# Patient Record
Sex: MALE
[De-identification: names, ages, dates, MRNs, and addresses within clinical notes are randomized per-mention and may not be internally consistent; named-entity substitution may affect disease eponyms.]

---

## 2017-10-17 ENCOUNTER — HOSPITAL ENCOUNTER (EMERGENCY)
Dept: HOSPITAL 5 - ED | Age: 67
Discharge: LEFT BEFORE DISCHARGE | End: 2017-10-17
Payer: MEDICARE

## 2017-10-17 VITALS — DIASTOLIC BLOOD PRESSURE: 110 MMHG | SYSTOLIC BLOOD PRESSURE: 143 MMHG

## 2017-10-17 DIAGNOSIS — Z53.21: ICD-10-CM

## 2017-10-17 DIAGNOSIS — R51: Primary | ICD-10-CM

## 2017-10-17 LAB
ANION GAP SERPL CALC-SCNC: 20 MMOL/L
APTT BLD: 30.4 SEC. (ref 24.2–36.6)
BASOPHILS NFR BLD AUTO: 0.2 % (ref 0–1.8)
BUN SERPL-MCNC: 12 MG/DL (ref 9–20)
BUN/CREAT SERPL: 15 %
CALCIUM SERPL-MCNC: 9.6 MG/DL (ref 8.4–10.2)
CHLORIDE SERPL-SCNC: 102.3 MMOL/L (ref 98–107)
CO2 SERPL-SCNC: 22 MMOL/L (ref 22–30)
EOSINOPHIL NFR BLD AUTO: 2.8 % (ref 0–4.3)
GLUCOSE SERPL-MCNC: 104 MG/DL (ref 75–100)
HCT VFR BLD CALC: 45.8 % (ref 35.5–45.6)
HGB BLD-MCNC: 15.3 GM/DL (ref 11.8–15.2)
INR PPP: 1.01 (ref 0.87–1.13)
MCH RBC QN AUTO: 28 PG (ref 28–32)
MCHC RBC AUTO-ENTMCNC: 34 % (ref 32–34)
MCV RBC AUTO: 83 FL (ref 84–94)
PLATELET # BLD: 261 K/MM3 (ref 140–440)
POTASSIUM SERPL-SCNC: 4 MMOL/L (ref 3.6–5)
RBC # BLD AUTO: 5.52 M/MM3 (ref 3.65–5.03)
SODIUM SERPL-SCNC: 140 MMOL/L (ref 137–145)
WBC # BLD AUTO: 12.4 K/MM3 (ref 4.5–11)

## 2017-10-17 PROCEDURE — 85610 PROTHROMBIN TIME: CPT

## 2017-10-17 PROCEDURE — 36415 COLL VENOUS BLD VENIPUNCTURE: CPT

## 2017-10-17 PROCEDURE — 84484 ASSAY OF TROPONIN QUANT: CPT

## 2017-10-17 PROCEDURE — 85670 THROMBIN TIME PLASMA: CPT

## 2017-10-17 PROCEDURE — 85730 THROMBOPLASTIN TIME PARTIAL: CPT

## 2017-10-17 PROCEDURE — 93010 ELECTROCARDIOGRAM REPORT: CPT

## 2017-10-17 PROCEDURE — 80048 BASIC METABOLIC PNL TOTAL CA: CPT

## 2017-10-17 PROCEDURE — 85025 COMPLETE CBC W/AUTO DIFF WBC: CPT

## 2017-10-17 PROCEDURE — 93005 ELECTROCARDIOGRAM TRACING: CPT

## 2017-10-17 PROCEDURE — 70450 CT HEAD/BRAIN W/O DYE: CPT

## 2017-10-17 NOTE — CAT SCAN REPORT
Cranial CT without contrast.



History: Altered mental status.



Findings: Comparison is made to previous study on Valery 10, 2017. There 

is no evidence of an acute hemorrhage or infarct. The posterior fossa 

is normal. There are no masses or extra-axial collections. There is no 

hyperdense MCA sign. The calvarium is normal.



Impression: No acute findings or interval changes.

## 2019-08-15 ENCOUNTER — HOSPITAL ENCOUNTER (INPATIENT)
Dept: HOSPITAL 5 - 3A | Age: 69
LOS: 8 days | Discharge: HOME HEALTH | DRG: 552 | End: 2019-08-23
Attending: PHYSICAL MEDICINE & REHABILITATION | Admitting: PHYSICAL MEDICINE & REHABILITATION
Payer: MEDICARE

## 2019-08-15 DIAGNOSIS — Z73.6: ICD-10-CM

## 2019-08-15 DIAGNOSIS — Z79.899: ICD-10-CM

## 2019-08-15 DIAGNOSIS — Z96.651: ICD-10-CM

## 2019-08-15 DIAGNOSIS — B19.20: ICD-10-CM

## 2019-08-15 DIAGNOSIS — Z79.82: ICD-10-CM

## 2019-08-15 DIAGNOSIS — Z95.0: ICD-10-CM

## 2019-08-15 DIAGNOSIS — G43.909: ICD-10-CM

## 2019-08-15 DIAGNOSIS — Z88.1: ICD-10-CM

## 2019-08-15 DIAGNOSIS — E78.5: ICD-10-CM

## 2019-08-15 DIAGNOSIS — R26.89: ICD-10-CM

## 2019-08-15 DIAGNOSIS — I48.91: ICD-10-CM

## 2019-08-15 DIAGNOSIS — R26.81: ICD-10-CM

## 2019-08-15 DIAGNOSIS — M43.27: Primary | ICD-10-CM

## 2019-08-15 DIAGNOSIS — F32.9: ICD-10-CM

## 2019-08-15 DIAGNOSIS — Z82.49: ICD-10-CM

## 2019-08-15 DIAGNOSIS — Z90.49: ICD-10-CM

## 2019-08-15 DIAGNOSIS — I10: ICD-10-CM

## 2019-08-15 DIAGNOSIS — Z98.1: ICD-10-CM

## 2019-08-15 DIAGNOSIS — F43.10: ICD-10-CM

## 2019-08-15 LAB
ALBUMIN SERPL-MCNC: 4.1 G/DL (ref 3.9–5)
ALT SERPL-CCNC: 33 UNITS/L (ref 7–56)
BUN SERPL-MCNC: 15 MG/DL (ref 9–20)
BUN/CREAT SERPL: 17 %
CALCIUM SERPL-MCNC: 9.6 MG/DL (ref 8.4–10.2)
HEMOLYSIS INDEX: 88

## 2019-08-15 PROCEDURE — 36415 COLL VENOUS BLD VENIPUNCTURE: CPT

## 2019-08-15 PROCEDURE — 85025 COMPLETE CBC W/AUTO DIFF WBC: CPT

## 2019-08-15 PROCEDURE — 80074 ACUTE HEPATITIS PANEL: CPT

## 2019-08-15 PROCEDURE — 80053 COMPREHEN METABOLIC PANEL: CPT

## 2019-08-15 PROCEDURE — 85027 COMPLETE CBC AUTOMATED: CPT

## 2019-08-15 PROCEDURE — 72192 CT PELVIS W/O DYE: CPT

## 2019-08-15 PROCEDURE — 72131 CT LUMBAR SPINE W/O DYE: CPT

## 2019-08-15 PROCEDURE — 80048 BASIC METABOLIC PNL TOTAL CA: CPT

## 2019-08-15 RX ADMIN — DOCUSATE SODIUM SCH MG: 100 CAPSULE, LIQUID FILLED ORAL at 21:48

## 2019-08-15 RX ADMIN — GABAPENTIN SCH MG: 300 CAPSULE ORAL at 21:47

## 2019-08-15 RX ADMIN — CARVEDILOL SCH: 3.12 TABLET, FILM COATED ORAL at 21:51

## 2019-08-15 RX ADMIN — VENLAFAXINE SCH MG: 37.5 TABLET ORAL at 21:47

## 2019-08-15 RX ADMIN — OXYCODONE PRN MG: 5 TABLET ORAL at 18:00

## 2019-08-15 RX ADMIN — TAMSULOSIN HYDROCHLORIDE SCH MG: 0.4 CAPSULE ORAL at 21:47

## 2019-08-15 RX ADMIN — VENLAFAXINE SCH: 37.5 TABLET ORAL at 21:55

## 2019-08-15 RX ADMIN — TAMSULOSIN HYDROCHLORIDE SCH: 0.4 CAPSULE ORAL at 21:55

## 2019-08-16 LAB
BASOPHILS # (AUTO): 0 K/MM3 (ref 0–0.1)
BASOPHILS NFR BLD AUTO: 0.2 % (ref 0–1.8)
BUN SERPL-MCNC: 17 MG/DL (ref 9–20)
BUN/CREAT SERPL: 21 %
CALCIUM SERPL-MCNC: 9.7 MG/DL (ref 8.4–10.2)
EOSINOPHIL # BLD AUTO: 0.8 K/MM3 (ref 0–0.4)
EOSINOPHIL NFR BLD AUTO: 6.3 % (ref 0–4.3)
HCT VFR BLD CALC: 46.2 % (ref 35.5–45.6)
HEMOLYSIS INDEX: 3
HGB BLD-MCNC: 15.3 GM/DL (ref 11.8–15.2)
LYMPHOCYTES # BLD AUTO: 1 K/MM3 (ref 1.2–5.4)
LYMPHOCYTES NFR BLD AUTO: 8 % (ref 13.4–35)
MCHC RBC AUTO-ENTMCNC: 33 % (ref 32–34)
MCV RBC AUTO: 89 FL (ref 84–94)
MONOCYTES # (AUTO): 1.5 K/MM3 (ref 0–0.8)
MONOCYTES % (AUTO): 12.7 % (ref 0–7.3)
PLATELET # BLD: 242 K/MM3 (ref 140–440)
RBC # BLD AUTO: 5.18 M/MM3 (ref 3.65–5.03)

## 2019-08-16 RX ADMIN — GABAPENTIN SCH MG: 300 CAPSULE ORAL at 21:36

## 2019-08-16 RX ADMIN — ENOXAPARIN SODIUM SCH MG: 100 INJECTION SUBCUTANEOUS at 21:49

## 2019-08-16 RX ADMIN — CARVEDILOL SCH MG: 3.12 TABLET, FILM COATED ORAL at 08:15

## 2019-08-16 RX ADMIN — NICOTINE SCH MG: 21 PATCH TRANSDERMAL at 08:15

## 2019-08-16 RX ADMIN — VENLAFAXINE SCH MG: 37.5 TABLET ORAL at 08:14

## 2019-08-16 RX ADMIN — LIDOCAINE SCH EACH: 50 PATCH TOPICAL at 22:27

## 2019-08-16 RX ADMIN — TAMSULOSIN HYDROCHLORIDE SCH MG: 0.4 CAPSULE ORAL at 21:37

## 2019-08-16 RX ADMIN — OXYCODONE SCH MG: 5 TABLET ORAL at 15:55

## 2019-08-16 RX ADMIN — Medication SCH MCG: at 08:14

## 2019-08-16 RX ADMIN — VENLAFAXINE SCH: 37.5 TABLET ORAL at 08:15

## 2019-08-16 RX ADMIN — GABAPENTIN SCH MG: 300 CAPSULE ORAL at 06:49

## 2019-08-16 RX ADMIN — CARVEDILOL SCH MG: 3.12 TABLET, FILM COATED ORAL at 21:36

## 2019-08-16 RX ADMIN — OXYCODONE PRN MG: 5 TABLET ORAL at 21:39

## 2019-08-16 RX ADMIN — OXYCODONE PRN MG: 5 TABLET ORAL at 15:54

## 2019-08-16 RX ADMIN — OXYCODONE PRN MG: 5 TABLET ORAL at 03:45

## 2019-08-16 RX ADMIN — VENLAFAXINE SCH MG: 37.5 TABLET ORAL at 21:37

## 2019-08-16 RX ADMIN — OXYCODONE PRN MG: 5 TABLET ORAL at 09:47

## 2019-08-16 RX ADMIN — DOCUSATE SODIUM SCH MG: 100 CAPSULE, LIQUID FILLED ORAL at 21:37

## 2019-08-16 RX ADMIN — PREGABALIN SCH MG: 25 CAPSULE ORAL at 21:36

## 2019-08-16 RX ADMIN — DOCUSATE SODIUM SCH MG: 100 CAPSULE, LIQUID FILLED ORAL at 08:14

## 2019-08-16 RX ADMIN — CHOLECALCIFEROL TAB 10 MCG (400 UNIT) SCH UNIT: 10 TAB at 08:14

## 2019-08-16 RX ADMIN — ASPIRIN SCH MG: 81 TABLET, COATED ORAL at 08:14

## 2019-08-16 RX ADMIN — FOLIC ACID SCH MG: 1 TABLET ORAL at 08:14

## 2019-08-16 RX ADMIN — Medication SCH MG: at 08:14

## 2019-08-16 NOTE — EVENT NOTE
Date: 08/16/19





Notified by nursing that patient called his surgeon complaining of pain.  Had a 

prolonged discussion with patient regarding pain and made several adjustments in

medications to better control his pain.  He was happy at the time.  Seems to be 

manipulative.  Pain described as neuropathic in nature.  Discussed plan with the

patient earlier today at length.





Surgeon gave the ok for lovenox.





Surgeon also requested CT lumbar and RLE.





Will order those items requested.





Will have discussion with patient.

## 2019-08-16 NOTE — IRU PLAN OF CARE
Interdisciplinary Plan of Care





- IP


IRU INTERDISCIPLINARY PLAN: 


                     [facility] Inpatient Rehab Unit Plan of Care





IRU Interdisciplinary Care Plan                            Start:  08/15/19 

16:47


Freq:   Admission then PRN                                 Status: Active       




Protocol:                                                                       




 Document     08/16/19 20:18  TH  (Rec: 08/16/19 20:23  TH  [id number])


 Interdisciplinary Problem List


     Interdisciplinary Problem List


      Interdisciplinary Problem List             Impaired Dressing,Impaired


       Query Text:Answers will Trigger Problems  Mobility,Pain Management,


       and Outcomes on Worklist.                 Knowledge Deficits,Impaired


                                                 Skin/Tissue Integrity,


                                                 Discharge Concerns,Impaired


                                                 Safety,Medications Education


 IRU Interdisciplinary Care Plan


     Therapy Services


      Therapy Services Will Include:             Physical Therapy,Occupational


       Query Text:Patient will be seen for a     Therapy


       minimum of 3 hours of daily therapy 5     


       out of 7 days a week.  Therapy intensity  


       may be adjusted within a 7 consecutive    


       day period to effectively serve the       


       individual needs of the patient.          


     Treatment Frequency/Intensity/Duration


      Treatment Frequency                        5 days per week


      Treatment Intensity                        3 hours per day


      Treatment Duration                         7-10 days


     Problem Area: Eating/Swallowing


      Eating/Swallowing Outcomes                 


      Eating/Swallowing Interventions            


     Problem Area: Bathing/Grooming


      Bathing/Grooming Outcomes                  Improve [place] w/


                                                 Grooming,Improve [place]


                                                 w/ Bathing


      Bathing/Grooming Interventions             ADL Training,Use of Assistive


                                                 Devices,Therapeutic Exercise,


                                                 Therapeutic Activity,


                                                 Neuromuscular Re-Education,


                                                 Balance Work,Activity


                                                 Tolerance Work,Patient/


                                                 Caregiver Education


     Problem Area: Dressing


      Dressing Outcomes                          Improve [place] w/ LB


                                                 Dressing


      Dressing Interventions                     ADL Training,Use of Assistive


                                                 Devices,Neuromuscular Re-


                                                 Education,Therapeutic Exercise


                                                 ,Patient/Caregiver Education


     Problem Area: Mobility


      Mobility Outcomes                          Improve [place] w/


                                                 Ambulation,Improve


                                                 [place] w/ Stairs/Curb


      Mobility Interventions                     Therapeutic Exercise,


                                                 Neuromuscular Re-Ed.,Activity


                                                 Tolerance Work,Use of


                                                 Assistive Devices,Patient/


                                                 Caregiver Education,Gait


                                                 Training,Household Mobility


                                                 Work


     Problem Area: Transfers


      Transfers Outcomes                         Improve [place] w/ Bed


                                                 Transfers,Improve [place]


                                                 w/ Toilet Transfers,Improve


                                                 [place] w/ Tub/Shower


                                                 Transfers


      Transfers Interventions                    Transfer Training,Therapeutic


                                                 Exercise,Neuromuscular Re-


                                                 Education,Visual/Perceptual


                                                 Training,Activity Tolerance


                                                 Work,Modalities,Use of


                                                 Assistive Devices,Patient/


                                                 Caregiver Education


     Problem Area: Bowel/Bladder Managment


      Bowel/Bladder Outcomes                     Continent of Bladder


      Bowel/Bladder Interventions                Patient/Caregiver Education


     Problem Area: Toileting


      Toileting Outcomes                         Improve [place] w/


                                                 Toileting


      Toileting Interventions                    Balance Work,Use of Assistive


                                                 Devices,Patient/Caregiver


                                                 Education


     Problem Area: Nutrition


      Nutrition Outcomes                         Understand and Comply w/ Diet


      Nutrition Interventions                    Patient/Caregiver Education


     Problem Area: Comprehension


      Comprehension Outcomes                     Follow Commands


      Comprehension Interventions                Patient/Caregiver Education


     Problem Area: Expression


      Expression Outcomes                        


      Expression Interventions                   


     Problem Area: Problem Solving


      Problem Solving Outcomes                   


      Problem Solving Interventions              


     Problem Area: Memory


      Memory Outcomes                            


      Memory Interventions                       


     Problem Area: Pain Management


      Pain Management Outcomes                   Demonstrate/Verbalize Pain


                                                 Strategies


      Pain Management Interventions              Medication Management,Stress


                                                 Management,Use of Devices/


                                                 Modalities (TENS, hot pack,


                                                 cold pack, etc.),Positioning/


                                                 Turning,Patient/Caregiver


                                                 Education


     Problem Area: Knowledge Deficits


      Knowledge Deficits Outcomes                


      Knowledge Deficits Interventions           


     Problem Area: Skin/Tissue Integrity


      Skin/Tissue Integrity Outcomes             Exhibit Healing of Wound/


                                                 Incision,Demonstrate


                                                 Understanding of Self Wound


                                                 Care


      Skin/Tissue Integrity Interventions        Skin/Wound Care,Dressing


                                                 Change Education,Positioning/


                                                 Turning


     Problem Area: Social Interaction


      Social Interaction Outcomes                


      Social Interaction Interventions           


     Problem Area: Adjustment to Disability


      Adjustment to Disability Outcomes          


      Adjustment to Disability Interventions     


     Problem Area: Discharge Concerns


      Discharge Concerns Outcomes                Discharge w/ Necessary


                                                 Equipment,Have Home Health/


                                                 Outpatient Services


      Discharge Concerns Interventions           Discharge Planning,Family/


                                                 Caregiver Conference,Family/


                                                 Caregiver Training


     Problem Area: Community Reintegration


      Community Reintegration Outcomes           Demonstrate Understanding of


                                                 Community Resources


      Community Reintegration Interventions      Activity Tolerance Work,


                                                 Provide Community Resources


     Problem Area: Home Management


      Home Management Outcomes                   Improve [place] w/ Home


                                                 Management


      Home Management Interventions              Activity Tolerance Work,


                                                 Patient/Caregiver Education


     Problem Area: Safety


      Safety Outcomes                            Provide Safe Environment


      Safety Interventions                       Identify Fall Risk,Reduce


                                                 Environmental Hazards,Neuro


                                                 Check Assessment,Implement


                                                 Mechanical Devices, i.e. Chair


                                                 Alarm (Post Fall Update),Re-


                                                 Educate Patient/Caregiver for


                                                 Safety (Post Fall Update)


     Problem Area: Medication Education


      Medication Education Outcomes              Patient/Caregiver will


                                                 Verbalize Understanding of


                                                 Medications


      Medication Education Interventions         Explain Administration/Side


                                                 Effects/Interactions


     Problem Area: Diabetes Education


      Diabetes Education Outcomes                


      Diabetes Education Interventions           


     Problem Area: Oxygenation


      Oxygenation Outcomes                       


      Oxygenation Interventions                  


     Problem Area: Cardiovascular


      Cardiovascular Outcomes                    


      Cardiovascular Interventions               


     Physician Only


      Medical Prognosis and Rehabilitation       Good rehab potential and 

medical prognosis.  Will need to monitor pain control


       Potential (Completed by Physician)        





This plan of care has been developed based on the findings from the pre-

admission assessment, post admission physician evaluation, information gathered 

from the assessments from all therapy disciplines and other pertinent 

clinicians. The plan of care has been reviewed and discussed in collaboration 

with the interdisciplinary team. The plan of care will be reviewed and updated 

at least weekly.

## 2019-08-16 NOTE — HISTORY AND PHYSICAL REPORT
History of Present Illness


Date: 08/16/19


Date of admission: 


08/15/19 16:38





Chief Complaint: 


s/p L4-5 fusion, decreased mobility and ADL





History of present illness: 


68-year-old male with a history of previous lumbar neurogenic claudication and 

surgical fixation who experienced a return of symptoms with severe lumbar 

radicular pain.  He was evaluated by Dr. Cruz who determined that he had 

lumbar instability with subluxation of L4-L5 and would be a good candidate for 

lateral approach for surgical repair.  He underwent fusion on 8/12 with 

application of interbody cage and lateral plate and tolerated the procedure 

well.  He is to wear his back brace when out of bed.  He has a follow-up 

scheduled with Dr. Cruz.  After admission and was called in the evening 

and nursing noted his pain was poorly controlled.  Patient has a history of 

cirrhosis which is states that he underwent treatment for and has now been 

cleared by his hepatologist.  I discussed the issue of taking Tylenol which he 

was insistent upon doing earlier and he has agreed to avoid Tylenol.  We'll 

adjust his doses of medications with a short-term of scheduled Roxicodone along 

with Lidoderm patch.  He also stated he was abruptly taken off of gabapentin 

when he was started on the Lyrica but due to his symptoms he started taking his 

home dose of gabapentin again.  While he is here we will perform a cross taper 

of gabapentin into Lyrica.  We'll contact surgeon for his preferences on timing 

of restarting DVT prophylaxis.





After the patient was medically stabilized they were transferred for further 

rehabilitation.  All available medical records have been reviewed.  Plan of care

was discussed with patient and family.








Past History


Past Medical History: atrial fib, hepatitis, hypertension, hyperlipidemia, 

migraines, other (PTSD, brain bleed (unknown type) after a fall, depression)


Past Surgical History: cholecystectomy, total knee replacement (right), Other 

(pacemaker,)


Social history: , full code, other (one-story home, previously used a 

cane but was otherwise independent).  denies: smoking, alcohol abuse (former), 

prescription drug abuse, IV drug use


Family history: CAD, hypertension





Medications and Allergies


                                    Allergies











Allergy/AdvReac Type Severity Reaction Status Date / Time


 


haloperidol [From Haldol] Allergy  Unknown Verified 09/27/14 11:15


 


haloperidol lactate Allergy  Unknown Verified 09/27/14 11:15





[From Haldol]     


 


ketorolac [From Toradol] Allergy  Unknown Unverified 08/15/19 15:04


 


meperidine HCl [From Demerol] Allergy  Unknown Verified 09/27/14 11:15


 


nitroglycerin Allergy  Unknown Verified 09/27/14 11:15


 


tramadol Allergy  Unknown Verified 09/27/14 11:15


 


acetaminophen [From Tylenol] AdvReac  Unknown Verified 08/15/19 21:33











                                Home Medications











 Medication  Instructions  Recorded  Confirmed  Last Taken  Type


 


Aspirin [Aspirin BABY CHEW TAB] 81 mg PO QDAY 06/11/17 08/16/19 08/15/19 08:00 

History


 


AtorvaSTATin [Lipitor] 20 mg PO QHS 06/11/17 08/16/19 08/14/19 21:00 History


 


Carvedilol [Coreg] 3.125 mg PO BID 06/11/17 08/16/19 08/15/19 08:00 History


 


FLUoxetine HCL [PROzac] 40 mg PO QDAY 06/11/17 08/16/19 Unknown History


 


Losartan [Cozaar] 50 mg PO QDAY 06/11/17 08/16/19 Unknown History


 


Pantoprazole [Protonix TAB] 40 mg PO QDAY 06/11/17 08/16/19 08/15/19 08:00 

History


 


Tamsulosin [Flomax] 0.4 mg PO QDAY 06/11/17 08/16/19 08/15/19 08:30 History


 


Folic Acid [Folvite] 1 mg PO QDAY #30 tablet 06/12/17 08/16/19 08/15/19 08:00 Rx


 


Thiamine [Vitamin B-1] 100 mg PO QDAY #30 tablet 06/12/17 08/16/19 08/15/19 

08:30 Rx


 


chlordiazePOXIDE [Librium] 0 mg PO Q8H #16 capsule 06/12/17 08/16/19 Unknown Rx


 


oxyCODONE [roxiCODONE] 5 mg PO TID PRN #7 tablet 06/12/17 08/16/19 08/15/19 

08:30 Rx











Active Meds: 


Active Medications





Aspirin (Halfprin Ec)  81 mg PO QDAY [facility]


Atorvastatin Calcium (Lipitor)  10 mg PO QHS [facility]


   Last Admin: 08/15/19 21:47 Dose:  10 mg


   Documented by: 


Bisacodyl (Dulcolax)  10 mg MD QDAY PRN


   PRN Reason: Constipation


Carvedilol (Coreg)  3.125 mg PO BID [facility]


   Last Admin: 08/15/19 21:51 Dose:  Not Given


   Documented by: 


Cholecalciferol (Vitamin D3)  1,000 unit PO QDAY [facility]


Clonazepam (Klonopin)  0.5 mg PO BID [facility]


   Last Admin: 08/15/19 21:47 Dose:  0.5 mg


   Documented by: 


Cyanocobalamin (Vitamin B-12)  1,000 mcg PO QDAY [facility]


Docusate Sodium (Colace)  100 mg PO BID [facility]


   Last Admin: 08/15/19 21:48 Dose:  100 mg


   Documented by: 


Folic Acid (Folvite)  1 mg PO QDAY [facility]


Gabapentin (Neurontin)  300 mg PO Q8HR [facility]


   Last Admin: 08/16/19 06:49 Dose:  300 mg


   Documented by: 


Nicotine (Habitrol)  21 mg TD QDAY [facility]


Ondansetron HCl (Zofran Odt)  4 mg PO Q8H PRN


   PRN Reason: Nausea And Vomiting


Oxycodone HCl (Roxicodone)  10 mg PO Q6H PRN


   PRN Reason: Pain, Moderate (4-6)


   Last Admin: 08/16/19 03:45 Dose:  10 mg


   Documented by: 


Oxycodone/Acetaminophen (Percocet 5/325)  1 tab PO Q6H PRN


   PRN Reason: Pain, Moderate (4-6)


   Last Admin: 08/15/19 23:23 Dose:  1 tab


   Documented by: 


Polyethylene Glycol (Miralax 3350)  17 gm PO QDAY PRN


   PRN Reason: Constipation


Tamsulosin HCl (Flomax)  0.4 mg PO [facility]


   Last Admin: 08/15/19 21:55 Dose:  Not Given


   Documented by: 


Thiamine HCl (Vitamin B-1)  100 mg PO QDAY [facility]


Venlafaxine HCl (Effexor)  37.5 mg PO BID [facility]


   Last Admin: 08/15/19 21:55 Dose:  Not Given


   Documented by: 











Review of Systems


All systems: negative (ROS negative for 12 systems except as noted below with 

pertinent positives and negatives.)


Constitutional: chronic pain


Ears, nose, mouth and throat: no dysphagia


Cardiovascular: no chest pain, no palpitations, no rapid/irregular heart beat, 

no syncope, no shortness of breath


Respiratory: no cough, no cough with sputum


Gastrointestinal: no abdominal pain, no nausea, no vomiting, no diarrhea, no 

constipation


Genitourinary Male: no dysuria


Musculoskeletal: leg numbness/tingling (right, along the frontal aspect to the 

knee)


Integumentary: wounds (staples intact on surgical wound on her right flank), no 

rash, no pruritis


Neurological: weakness, parathesias


Psychiatric: anxiety, depression





Exam





- Exam


Narrative exam: 


MUSCULOSKELETAL SPECIALTY EXAM





CONSTITUTIONAL:


Well developed, well nourished, appropriately groomed, obese





LYMPHATIC: 


No appreciable abnormalities palpable in neck





EENT:


Visual fields full to confrontation.  EOMI.  Oropharynx clear. Hearing intact to

 soft voice





RESPIRATORY:


Clear to auscultation bilaterally, no increased work of breathing 





CARDIOVASCULAR:  


Regular Rate/ Rhythm, no swelling, edema or tenderness in BUE or BLE. Pulses 

palpable in all extremities. All extremities warm.  





GI: 


+ bowel sounds, soft, NTTP, nondistended.





INTEGUMENTARY:


Normal, no lesion, rash, masses or bruising noted in extremities.  Surgical site

 on her right flank intact with no signs of infection or drainage, staples 

intact.





MUSCULOSKELETAL:


BUE and BLE normal without defect, crepitus, subluxation, effusion, arthritic 

changes or TTP. 


BUE 4+/5, good ROM, with normal tone.  


LLE 4+/5 good ROM, with normal tone, RLE 4-/5 reduced ROM, with normal tone








NEURO:


CN 2-12 grossly intact. Sensation intact in all extremities.  Reflexes 2+ 

bilaterally at biceps, brachioradialis and 1+ at patella.  No clonus at ankles. 

Coordination intact in BUE. No tremor noted in 4 extremities.   





POSTURE and GAIT:


Sitting posture good. Balance appears reasonable.  Able to take short amount of 

steps without overt loss of balance.





PSYCH:


Alert, oriented x3, affect appears normal. Insight appears intact.








- Constitutional


Vitals: 


                               Vital Signs - 12hr











  08/15/19 08/15/19 08/16/19





  22:00 23:23 00:23


 


Temperature   


 


Pulse Rate   


 


Respiratory  17 16





Rate   


 


Respiratory 17  





Rate [Head]   


 


Blood Pressure   


 


O2 Sat by Pulse   





Oximetry   














  08/16/19 08/16/19 08/16/19





  03:45 04:30 04:45


 


Temperature  37.1 C 


 


Pulse Rate  60 


 


Respiratory 17 19 17





Rate   


 


Respiratory   





Rate [Head]   


 


Blood Pressure  122/68 


 


O2 Sat by Pulse  91 





Oximetry   














- Labs


CBC & Chem 7: 


                                 08/16/19 06:30





                                 08/16/19 06:30


Labs: 


                         Laboratory Results - last 72 hr











  08/15/19 08/16/19 08/16/19





  21:06 06:30 06:30


 


WBC   12.0 H 


 


RBC   5.18 H 


 


Hgb   15.3 H 


 


Hct   46.2 H 


 


MCV   89 


 


MCH   29 


 


MCHC   33 


 


RDW   14.7 


 


Plt Count   242 


 


Lymph % (Auto)   8.0 L 


 


Mono % (Auto)   12.7 H 


 


Eos % (Auto)   6.3 H 


 


Baso % (Auto)   0.2 


 


Lymph #   1.0 L 


 


Mono #   1.5 H 


 


Eos #   0.8 H 


 


Baso #   0.0 


 


Seg Neutrophils %   72.8 H 


 


Seg Neutrophils #   8.8 H 


 


Sodium  138   142


 


Potassium  4.9   5.3 H


 


Chloride  99.1   101.3


 


Carbon Dioxide  28   30


 


Anion Gap  16   16


 


BUN  15   17


 


Creatinine  0.9   0.8


 


Estimated GFR  > 60   > 60


 


BUN/Creatinine Ratio  17   21


 


Glucose  107 H   124 H


 


Calcium  9.6   9.7


 


Total Bilirubin  0.30  


 


AST  33  


 


ALT  33  


 


Alkaline Phosphatase  118  


 


Total Protein  7.0  


 


Albumin  4.1  


 


Albumin/Globulin Ratio  1.4  














Assessment and Plan


Assessment and plan: 





Patient was assessed and evaluated for Acute Inpatient Rehab Unit.


Due to the patients above-mentioned medical complexity, along with decreased 

functional mobility and self care, this patient continues to require and be 

appropriate for a comprehensive, multidisciplinary acute-in-patient 

rehabilitation program.  These needs cannot be met in an outpatient or other 

less intensive setting.  The patient would continue to benefit from skilled 

therapy intervention for at least 3 hours per day, five days a week, with 

techniques specific to the needs of the patient to improve function, activities 

of daily living, and reintegration into the community. The patient continues to 

require:


-- OT to improve ROM, self-care, and learn use of adaptive equipment


-- PT to improve strength and balance, functional transfers, and ambulation with

 energy conservation techniques to improve functional mobility


-- 24 hour RN to ensure and prevent skin breakdown, promote progressive 

independence while ensuring safety, ensure education regarding medications, and 

incorporation of the rehabilitation at the bedside


-- 24 hour Physiatrist to coordinate this interdisciplinary program, and to 

manage/prevent complications as a result of the patients medical comorbidities.

 


-Plan of care by day 4


-Weekly team conferences


With such a program, there is a reasonable certainty that the goals 

individualized for this patient can be achieved within the specified length of 

stay.





Status post L4 5 fusion: Pain poorly controlled currently will adjust pain 

medications and monitor for effect.  Patient is no longer seeing outpatient pain

 [facility]  We'll try to adjust his regimen for 

appropriate functional pain control.  Monitor incision site for any signs of 

bleeding or infection.  Staples are to be removed August 21.  Follow-up with 

 in 2 weeks.  Monitor for any changes in neurological status 

including saddle anesthesia.





Hypertension: Continue medications and adjust for normotension


Hyperlipidemia: Continue statin


Hepatitis C: Patient states that he was treated and has been cleared of this.  

We'll continue avoid medications with adverse effect on liver.


Depression and PTSD: Continue medications monitor for any adverse effects.  

Patient typically takes desvenlafaxine which we do not carry.  We'll substitute 

venlafaxine


Anxiety: continue Klonopin and monitor


Z73.6 ADL dysfunction: OT will work on improving ability to perform ADLs (

including assistive devices) to increase independence and decrease caregiver 

burden and improve functional transfers and mobility training.


R26.2 Difficulty walking: PT will work on gait training and proper use of 

assistive devices and advance as appropriate to use of stairs and outside 

ambulation on uneven surfaces.


R26.81 Unsteadiness on feet: PT will work on improving static and dynamic 

sitting and standing balance as well as proper use of assistive devices to 

decrease risk of falls.


R26.89 Abnormality of gait: PT will work to improve safety and efficiency of 

gait through neuromotor training and gait training along with instruction on 

proper use of assistive devices.


M62.81 Muscle weakness: PT & OT will work on strengthening exercises to improve 

functional strength including mixture of closed and open kinetic chain 

exercises.


R53.81 Debility: PT & OT will work on improving overall functional status to 

improve participation with ADLs, mobility and social involvement.


R53.83 Fatigue: PT & OT will work on improving endurance through aerobic 

exercises and therapeutic activity while monitoring patients tolerance for 

activity and vital signs as needed.


 





DVT ppx: SCDs, no pharmacologic methods until cleared by surgeon


Pain: Continue physical modalities in therapy and pain medications as needed to 

achieve functional pain control.  Adjust medications with scheduled dose of 

Roxicodone and breakthrough, Lidoderm patch at night, cross taper of gabapentin 

and Lyrica


Sleep: Monitor and address as needed. 


Bowel: Monitor and address as needed.  


Appetite: Monitor and address as needed.


Discharge planning: Pending therapy progress and care plan meeting. Will 

continue discussion with therapy team, SW, patient and family.





Restrictions/ Precautions:


Falls, pacemaker, spinal postsurgical precaution,, back brace on when out of bed





WB status: FWB





Functional Hx:


ADLs: Independent


Cognition: Independent


Mobility: Cane and at times felt like he could use a walker








Barriers to Discharge: Decreased mobility and ability to perform self care, 

balance deficits, weakness


Estimated Length of Stay: 1014 days


Discharge Destination: Home with family








POST ADMISSION PHYSICIAN EVALUATION


I have examined the patient and find that functional status, medical condition 

and appropriateness for IR[facility] admission are essentially unchanged from those 

described in the preadmission screening. Will monitor for worsening neurologic 

function, surgical wound drainage and are infection, saddle anesthesia, pain, 

DVT/PE,  bowel and bladder complications and complications due to hypertension, 

hepatitis C, depression/anxiety, anemia and electrolyte abnormalities.  Will 

attempt to avoid occurrence of these issues or treat them if they present 

themselves.

## 2019-08-17 LAB
BUN SERPL-MCNC: 16 MG/DL (ref 9–20)
BUN/CREAT SERPL: 23 %
CALCIUM SERPL-MCNC: 9.6 MG/DL (ref 8.4–10.2)
HCT VFR BLD CALC: 44.1 % (ref 35.5–45.6)
HEMOLYSIS INDEX: 2
HGB BLD-MCNC: 14.7 GM/DL (ref 11.8–15.2)
MCHC RBC AUTO-ENTMCNC: 33 % (ref 32–34)
MCV RBC AUTO: 88 FL (ref 84–94)
PLATELET # BLD: 247 K/MM3 (ref 140–440)
RBC # BLD AUTO: 4.99 M/MM3 (ref 3.65–5.03)

## 2019-08-17 RX ADMIN — DOCUSATE SODIUM SCH MG: 100 CAPSULE, LIQUID FILLED ORAL at 21:38

## 2019-08-17 RX ADMIN — OXYCODONE SCH MG: 5 TABLET ORAL at 17:54

## 2019-08-17 RX ADMIN — ASPIRIN SCH MG: 81 TABLET, COATED ORAL at 08:54

## 2019-08-17 RX ADMIN — PREGABALIN SCH MG: 25 CAPSULE ORAL at 21:38

## 2019-08-17 RX ADMIN — PREGABALIN SCH MG: 25 CAPSULE ORAL at 13:57

## 2019-08-17 RX ADMIN — VENLAFAXINE SCH MG: 37.5 TABLET ORAL at 08:53

## 2019-08-17 RX ADMIN — GABAPENTIN SCH MG: 300 CAPSULE ORAL at 08:53

## 2019-08-17 RX ADMIN — FOLIC ACID SCH MG: 1 TABLET ORAL at 08:53

## 2019-08-17 RX ADMIN — CARVEDILOL SCH MG: 3.12 TABLET, FILM COATED ORAL at 21:37

## 2019-08-17 RX ADMIN — Medication SCH MCG: at 08:53

## 2019-08-17 RX ADMIN — ENOXAPARIN SODIUM SCH MG: 100 INJECTION SUBCUTANEOUS at 21:41

## 2019-08-17 RX ADMIN — DOCUSATE SODIUM SCH MG: 100 CAPSULE, LIQUID FILLED ORAL at 08:53

## 2019-08-17 RX ADMIN — LIDOCAINE SCH EACH: 50 PATCH TOPICAL at 08:52

## 2019-08-17 RX ADMIN — NICOTINE SCH MG: 21 PATCH TRANSDERMAL at 08:54

## 2019-08-17 RX ADMIN — GABAPENTIN SCH MG: 300 CAPSULE ORAL at 21:40

## 2019-08-17 RX ADMIN — OXYCODONE PRN MG: 5 TABLET ORAL at 02:48

## 2019-08-17 RX ADMIN — OXYCODONE SCH MG: 5 TABLET ORAL at 06:28

## 2019-08-17 RX ADMIN — OXYCODONE PRN MG: 5 TABLET ORAL at 16:11

## 2019-08-17 RX ADMIN — OXYCODONE SCH MG: 5 TABLET ORAL at 00:00

## 2019-08-17 RX ADMIN — Medication SCH MG: at 08:53

## 2019-08-17 RX ADMIN — OXYCODONE SCH MG: 5 TABLET ORAL at 12:08

## 2019-08-17 RX ADMIN — CHOLECALCIFEROL TAB 10 MCG (400 UNIT) SCH UNIT: 10 TAB at 08:54

## 2019-08-17 RX ADMIN — OXYCODONE PRN MG: 5 TABLET ORAL at 09:03

## 2019-08-17 RX ADMIN — OXYCODONE PRN MG: 5 TABLET ORAL at 21:40

## 2019-08-17 RX ADMIN — CARVEDILOL SCH MG: 3.12 TABLET, FILM COATED ORAL at 08:53

## 2019-08-17 RX ADMIN — TAMSULOSIN HYDROCHLORIDE SCH MG: 0.4 CAPSULE ORAL at 21:52

## 2019-08-17 NOTE — CAT SCAN REPORT
CT LUMBAR SPINE WITHOUT CONTRAST



INDICATION / CLINICAL INFORMATION:

Low back pain. Prior lumbar fusion.



TECHNIQUE:

Axial CT images were obtained through the lumbar spine. Sagittal and coronal reformatted images were 
produced. All CT scans at this location are performed using CT dose reduction for ALARA by means of a
utomated exposure control. 



COMPARISON:

There are no preoperative studies available for comparison..



FINDINGS:



POSTOPERATIVE CHANGES: Patient is status post right lateral fusion and laminectomy at L4-5. Interbody
 spacer is in satisfactory position. The L4 vertebral body screws in close proximity to the inferior 
endplate. The L5 vertebral body screw is located in the central portion of the L5 vertebral body. Pat
ient is also status post laminectomy at L5-S1.



ALIGNMENT: Mild grade 1 spondylolisthesis is present at the L4-5 level. Otherwise normal alignment is
 maintained.



VERTEBRAE: Postoperative changes at L4-5. In addition to right lateral fusion and laminectomy has bee
n performed.



DISC SPACES: Postoperative changes are present at L4-5. Near-complete loss of disc height and disc va
cuum phenomena are noted at L5-S1. Disc height is fairly well maintained at and above the L3-4 level.




LEVEL BY LEVEL ANALYSIS:



L1-2:No abnormality.



L2-3:No abnormality.



L3-4:No abnormality.



L4-5: Status post right lateral fusion and laminectomy as described above.  Marked erosive changes ar
e noted at the L4-5 facet joints bilaterally. This suggests the possibility of infectious or inflamma
tory arthritic change. Grade 1 spondylolisthesis listhesis broad-based disc bulge and facet arthritic
 changes contribute to apparent mild central canal stenosis in spite of laminectomy.



L5-S1: Loss of disc height and disc vacuum phenomena are prominent findings at this level. Patient is
 status post laminectomy. Moderate facet arthropathy is observed. Central spinal canal appears to be 
adequately maintained. Moderate bilateral neuroforaminal stenosis is present at the L5 nerve root lev
el secondary loss of disc height and facet arthropathy.





SPINAL CANAL: Central spinal canal appears narrowed at L4-5 as described above. This is in spite of c
ompressive laminectomy.

SACRUM:No significant abnormality of the visualized sacrum. Evaluation of the sacroiliac joints is re
markable for prominent anterior osteophyte bridging the anterior aspect of the left SI joint. Bilater
al SI joint vacuum phenomena is noted.

PARASPINAL SOFT TISSUES: No significant abnormality. 





IMPRESSION:



1. Postoperative changes at L4-5 and L5-S1 as described above.

2. Marked erosive changes are noted at the L4-5 facet joints bilaterally. This just the possibility o
f infectious or inflammatory arthritic change. Possibility of advanced arthropathy secondary to segme
ntal instability could also be considered. Correlation with preoperative imaging studies is suggested
.

3. Facet arthropathy, spondylolisthesis and broad-based disc bulge at L4-5 appear to contribute to mi
ld central canal stenosis in spite of decompressive laminectomy.



Signer Name: Ferny Sherman MD 

Signed: 8/17/2019 12:08 PM

 Workstation Name: [id number]

## 2019-08-17 NOTE — CAT SCAN REPORT
CT lower extremity RT wo con



INDICATION:

Pain.



TECHNIQUE:

All CT scans at this location are performed using CT dose reduction for ALARA by means of automated e
xposure control. 



COMPARISON:

None available.



FINDINGS:

Images through the lower pelvis show asymmetry of the iliacus muscles, right larger than left. Right 
femur itself is negative, with right knee prosthesis. There is a 3.5 cm lipoma in a quadriceps muscle
 at the level of the mid thigh. No significant soft tissue lesions.



Lower leg is also unremarkable.



IMPRESSION:

1. Right knee prosthesis. 3.5 cm lipoma in the quadriceps muscle. Asymmetry of the iliacus muscles, r
ight larger than left.



None of these findings are thought to be significant.



Signer Name: Reyes Galvez MD 

Signed: 8/17/2019 2:41 PM

 Workstation Name: [id number]

## 2019-08-17 NOTE — CAT SCAN REPORT
CLINICAL DATA:  



Pain



TECHNICAL DATA:



CT imaging of pelvis without IV nonionic contrast infusion was performed. Imaging was presented in th
e axial, coronal and sagittal imaging planes.All CT scans at this location are performed using CT dos
e reduction for ALARA by means of automated exposure control. 



FINDINGS



FINDINGS:



There is no evidence of pelvic mass or focal or free fluid collection.  There is no evidence of pelvi
c lymphadenopathy.



Visualized portions of the pelvic vasculature and small and large bowel are normal.



Surgical changes lumbar sacral spine with hardware in place.



Also noted are seen within the prostate gland radiation therapy



IMPRESSION:





1. Surgical changes lumbar sacral junction

2. No acute abnormality identified



Signer Name: Tra Gold MD 

Signed: 8/17/2019 11:42 AM

 Workstation Name: [id number]

## 2019-08-18 LAB
BUN SERPL-MCNC: 17 MG/DL (ref 9–20)
BUN/CREAT SERPL: 24 %
CALCIUM SERPL-MCNC: 9.2 MG/DL (ref 8.4–10.2)
HCT VFR BLD CALC: 41.9 % (ref 35.5–45.6)
HEMOLYSIS INDEX: 1
HGB BLD-MCNC: 14.1 GM/DL (ref 11.8–15.2)
MCHC RBC AUTO-ENTMCNC: 34 % (ref 32–34)
MCV RBC AUTO: 88 FL (ref 84–94)
PLATELET # BLD: 242 K/MM3 (ref 140–440)
RBC # BLD AUTO: 4.76 M/MM3 (ref 3.65–5.03)

## 2019-08-18 RX ADMIN — OXYCODONE PRN MG: 5 TABLET ORAL at 15:45

## 2019-08-18 RX ADMIN — DOCUSATE SODIUM SCH MG: 100 CAPSULE, LIQUID FILLED ORAL at 09:04

## 2019-08-18 RX ADMIN — CHOLECALCIFEROL TAB 10 MCG (400 UNIT) SCH UNIT: 10 TAB at 09:05

## 2019-08-18 RX ADMIN — OXYCODONE SCH MG: 5 TABLET ORAL at 18:17

## 2019-08-18 RX ADMIN — FOLIC ACID SCH MG: 1 TABLET ORAL at 09:04

## 2019-08-18 RX ADMIN — CARVEDILOL SCH MG: 3.12 TABLET, FILM COATED ORAL at 21:05

## 2019-08-18 RX ADMIN — TAMSULOSIN HYDROCHLORIDE SCH MG: 0.4 CAPSULE ORAL at 21:05

## 2019-08-18 RX ADMIN — VENLAFAXINE SCH MG: 37.5 TABLET ORAL at 21:04

## 2019-08-18 RX ADMIN — OXYCODONE PRN MG: 5 TABLET ORAL at 04:43

## 2019-08-18 RX ADMIN — PREGABALIN SCH MG: 75 CAPSULE ORAL at 21:03

## 2019-08-18 RX ADMIN — ENOXAPARIN SODIUM SCH MG: 100 INJECTION SUBCUTANEOUS at 21:05

## 2019-08-18 RX ADMIN — ASPIRIN SCH MG: 81 TABLET, COATED ORAL at 09:04

## 2019-08-18 RX ADMIN — VENLAFAXINE SCH MG: 37.5 TABLET ORAL at 09:05

## 2019-08-18 RX ADMIN — PREGABALIN SCH MG: 75 CAPSULE ORAL at 09:30

## 2019-08-18 RX ADMIN — PREGABALIN SCH: 25 CAPSULE ORAL at 11:59

## 2019-08-18 RX ADMIN — GABAPENTIN SCH MG: 300 CAPSULE ORAL at 09:06

## 2019-08-18 RX ADMIN — OXYCODONE SCH MG: 5 TABLET ORAL at 06:17

## 2019-08-18 RX ADMIN — GABAPENTIN SCH MG: 300 CAPSULE ORAL at 21:04

## 2019-08-18 RX ADMIN — OXYCODONE SCH MG: 5 TABLET ORAL at 13:17

## 2019-08-18 RX ADMIN — LIDOCAINE SCH EACH: 50 PATCH TOPICAL at 09:10

## 2019-08-18 RX ADMIN — NICOTINE SCH MG: 21 PATCH TRANSDERMAL at 09:05

## 2019-08-18 RX ADMIN — PREGABALIN SCH MG: 25 CAPSULE ORAL at 09:30

## 2019-08-18 RX ADMIN — CARVEDILOL SCH MG: 3.12 TABLET, FILM COATED ORAL at 13:16

## 2019-08-18 RX ADMIN — PREGABALIN SCH MG: 25 CAPSULE ORAL at 21:04

## 2019-08-18 RX ADMIN — Medication SCH MG: at 09:04

## 2019-08-18 RX ADMIN — OXYCODONE PRN MG: 5 TABLET ORAL at 21:03

## 2019-08-18 RX ADMIN — OXYCODONE SCH MG: 5 TABLET ORAL at 00:00

## 2019-08-18 RX ADMIN — Medication SCH MCG: at 09:04

## 2019-08-18 RX ADMIN — OXYCODONE PRN MG: 5 TABLET ORAL at 09:30

## 2019-08-19 LAB
BUN SERPL-MCNC: 17 MG/DL (ref 9–20)
BUN/CREAT SERPL: 28 %
CALCIUM SERPL-MCNC: 9.4 MG/DL (ref 8.4–10.2)
HCT VFR BLD CALC: 41.4 % (ref 35.5–45.6)
HEMOLYSIS INDEX: 1
HGB BLD-MCNC: 14.4 GM/DL (ref 11.8–15.2)
MCHC RBC AUTO-ENTMCNC: 35 % (ref 32–34)
MCV RBC AUTO: 87 FL (ref 84–94)
PLATELET # BLD: 259 K/MM3 (ref 140–440)
RBC # BLD AUTO: 4.76 M/MM3 (ref 3.65–5.03)

## 2019-08-19 RX ADMIN — VENLAFAXINE SCH MG: 37.5 TABLET ORAL at 08:33

## 2019-08-19 RX ADMIN — GABAPENTIN SCH MG: 300 CAPSULE ORAL at 08:33

## 2019-08-19 RX ADMIN — OXYCODONE SCH MG: 5 TABLET ORAL at 23:56

## 2019-08-19 RX ADMIN — PREGABALIN SCH MG: 75 CAPSULE ORAL at 08:34

## 2019-08-19 RX ADMIN — OXYCODONE SCH MG: 5 TABLET ORAL at 19:02

## 2019-08-19 RX ADMIN — OXYCODONE PRN MG: 5 TABLET ORAL at 20:56

## 2019-08-19 RX ADMIN — PREGABALIN SCH MG: 25 CAPSULE ORAL at 08:34

## 2019-08-19 RX ADMIN — PREGABALIN SCH MG: 75 CAPSULE ORAL at 21:01

## 2019-08-19 RX ADMIN — VENLAFAXINE SCH MG: 37.5 TABLET ORAL at 21:02

## 2019-08-19 RX ADMIN — OXYCODONE PRN MG: 5 TABLET ORAL at 09:37

## 2019-08-19 RX ADMIN — GABAPENTIN SCH MG: 300 CAPSULE ORAL at 21:02

## 2019-08-19 RX ADMIN — Medication SCH MG: at 08:34

## 2019-08-19 RX ADMIN — CHOLECALCIFEROL TAB 10 MCG (400 UNIT) SCH UNIT: 10 TAB at 08:36

## 2019-08-19 RX ADMIN — ASPIRIN SCH MG: 81 TABLET, COATED ORAL at 08:39

## 2019-08-19 RX ADMIN — DOCUSATE SODIUM SCH MG: 100 CAPSULE, LIQUID FILLED ORAL at 08:35

## 2019-08-19 RX ADMIN — DOCUSATE SODIUM SCH MG: 100 CAPSULE, LIQUID FILLED ORAL at 00:02

## 2019-08-19 RX ADMIN — OXYCODONE PRN MG: 5 TABLET ORAL at 06:22

## 2019-08-19 RX ADMIN — FOLIC ACID SCH MG: 1 TABLET ORAL at 08:34

## 2019-08-19 RX ADMIN — CARVEDILOL SCH MG: 3.12 TABLET, FILM COATED ORAL at 08:35

## 2019-08-19 RX ADMIN — OXYCODONE PRN MG: 5 TABLET ORAL at 03:42

## 2019-08-19 RX ADMIN — OXYCODONE SCH: 5 TABLET ORAL at 06:26

## 2019-08-19 RX ADMIN — TAMSULOSIN HYDROCHLORIDE SCH MG: 0.4 CAPSULE ORAL at 21:02

## 2019-08-19 RX ADMIN — LIDOCAINE SCH: 50 PATCH TOPICAL at 09:29

## 2019-08-19 RX ADMIN — PREGABALIN SCH MG: 25 CAPSULE ORAL at 21:01

## 2019-08-19 RX ADMIN — DOCUSATE SODIUM SCH MG: 100 CAPSULE, LIQUID FILLED ORAL at 21:01

## 2019-08-19 RX ADMIN — DEXAMETHASONE SODIUM PHOSPHATE SCH: 4 INJECTION, SOLUTION INTRAMUSCULAR; INTRAVENOUS at 23:58

## 2019-08-19 RX ADMIN — CARVEDILOL SCH MG: 3.12 TABLET, FILM COATED ORAL at 21:06

## 2019-08-19 RX ADMIN — Medication SCH MCG: at 08:33

## 2019-08-19 RX ADMIN — OXYCODONE SCH MG: 5 TABLET ORAL at 14:55

## 2019-08-19 RX ADMIN — ENOXAPARIN SODIUM SCH MG: 100 INJECTION SUBCUTANEOUS at 21:02

## 2019-08-19 RX ADMIN — OXYCODONE SCH MG: 5 TABLET ORAL at 00:01

## 2019-08-19 RX ADMIN — NICOTINE SCH MG: 21 PATCH TRANSDERMAL at 08:34

## 2019-08-19 RX ADMIN — DEXAMETHASONE SODIUM PHOSPHATE SCH: 4 INJECTION, SOLUTION INTRAMUSCULAR; INTRAVENOUS at 21:06

## 2019-08-19 NOTE — PROGRESS NOTE
Subjective


Date of service: 08/19/19


Principal diagnosis: s/p L4-5 fusion, decreased mobility and ADL


Interval history: 





68-year-old male with a history of previous lumbar neurogenic claudication and 

surgical fixation who experienced a return of symptoms with severe lumbar 

radicular pain.  He was evaluated by Dr. Cruz who determined that he had 

lumbar instability with subluxation of L4-L5 and would be a good candidate for 

lateral approach for surgical repair.  He underwent fusion on 8/12 with 

application of interbody cage and lateral plate and tolerated the procedure 

well.  He is to wear his back brace when out of bed.  He has a follow-up 

scheduled with Dr. Cruz.  After admission and was called in the evening 

and nursing noted his pain was poorly controlled.  Patient has a history of 

cirrhosis which is states that he underwent treatment for and has now been 

cleared by his hepatologist.  I discussed the issue of taking Tylenol which he 

was insistent upon doing earlier and he has agreed to avoid Tylenol.  We'll 

adjust his doses of medications with a short-term of scheduled Roxicodone along 

with Lidoderm patch.  He also stated he was abruptly taken off of gabapentin wh

en he was started on the Lyrica but due to his symptoms he started taking his 

home dose of gabapentin again.  While he is here we will perform a cross taper 

of gabapentin into Lyrica.  





Patient is participating in therapy and making progress.  He is getting up 

without calling for assistance.  Have discussed the danger of this with him.  

Over the weekend he called his surgeon and complained of excruciating pain.  

Surgeon requested imaging.  Have started lovenox per request of surgeon.  

Imaging reviewed and forwarded to surgeon.  Patient has behaviors c/w seeking. 

Complains of intense pain but is then able to function without any intervention 

or issue.  Adjusted pain medication on day one of therapy.  Started on cross 

taper of gabapentin and lyrica with an increase in lyrica.  Started lidoderm.  

Patient involved in all of these decisions on Friday, yet he still was asking to

go to ER to get an IV for IV pain meds over the weekend!  Imaging does show some

mild stenosis and facet arthropathy.  Will try a short taper of decadron to calm

down the inflammation. He is aware of back brace requirements but is not always 

adherent.  Denies any other problem besides pain, which he describes as 

neuropathic in nature radiating from his back to front thigh and groin.  








Objective





- Exam


Narrative Exam: 


MUSCULOSKELETAL SPECIALTY EXAM





CONSTITUTIONAL:


Well developed, well nourished, appropriately groomed, obese





EENT:


EOMI.   Hearing intact to soft voice





RESPIRATORY:


Clear to auscultation bilaterally, no increased work of breathing 





CARDIOVASCULAR:  


Regular Rate/ Rhythm, no swelling, edema or tenderness in BUE or BLE. All 

extremities warm.  





GI: 


+ bowel sounds, soft, NTTP, nondistended.





INTEGUMENTARY:


Normal, no lesion, rash, masses or bruising noted in extremities.  Surgical site

on her right flank intact with no signs of infection or drainage, staples 

intact.





MUSCULOSKELETAL:


BUE and BLE normal without defect, crepitus, subluxation, effusion, arthritic 

changes or TTP. 


BUE 4+/5, good ROM, with normal tone.  


LLE 4+/5 good ROM, with normal tone, RLE 4-/5 reduced ROM, with normal tone








NEURO:


CN 2-12 grossly intact. Sensation intact in all extremities.  No tremor noted in

4 extremities.   





POSTURE and GAIT:


Sitting posture good. Balance appears reasonable.  Able to take short amount of 

steps without overt loss of balance.





PSYCH:


Alert, oriented x3, affect appears normal. Insight appears intact.








- Constitutional


Vitals: 


                               Vital Signs - 12hr











  08/18/19 08/18/19 08/19/19





  21:05 22:00 00:01


 


Temperature   


 


Pulse Rate 67  


 


Respiratory   18





Rate   


 


Respiratory  18 





Rate [Right   





Groin]   


 


Respiratory  18 





Rate [Right   





Thigh]   


 


Blood Pressure 128/65  


 


O2 Sat by Pulse   





Oximetry   














  08/19/19 08/19/19 08/19/19





  03:42 04:02 06:22


 


Temperature  37.1 C 


 


Pulse Rate  60 


 


Respiratory 18 19 17





Rate   


 


Respiratory   





Rate [Right   





Groin]   


 


Respiratory   





Rate [Right   





Thigh]   


 


Blood Pressure  109/48 


 


O2 Sat by Pulse  90 





Oximetry   














  08/19/19





  07:22


 


Temperature 


 


Pulse Rate 


 


Respiratory 17





Rate 


 


Respiratory 





Rate [Right 





Groin] 


 


Respiratory 





Rate [Right 





Thigh] 


 


Blood Pressure 


 


O2 Sat by Pulse 





Oximetry 














- Allied health notes


Allied health notes reviewed: nursing, PT, OT


FIMS assessment as documented by PT/OT/ST: 


Grooming





Patient cleans teeth/dentures:   Yes


Patient iglesias/brushes hair:      Yes


Patient washes, rinses and       Yes


dries face:                      


Patient washes, rinses and       Yes


dries hands:                     


Patient shaves:                  No


Patient applies make-up:         No


Patient performs (no make-up/    4/4 (100%)


shaving):                        


Grooming FIM Score               5. Supervision (Bernard applies toothpaste or


                                 opens containers.)





Toileting





Toileting Device                 Commode over Toilet,Grab Bar


Patient able to:                 Adjust clothes before,Clean self,Adjust clothes


                                 after


Patient able to perform:         3/3 (100%)


Toileting FIM Score              5. Supv./Set-Up (Needs stand-by, set-up,


                                 applying prosth/orth.)





Social interaction/Memory/Problem solving





Social Interaction FIM Score     6. Mod. Ness (Mostly appropriate. May


                                 need meds. No supv.)


Memory FIM Score                 3. Moderate Assistance (Recognizes and 

remembers


                                 50-74%.)


Problem Solving FIM Score        5. Supervision (Needs cueing <10% to solve


                                 routine problems.)





Transfers





Mode of Locomotion:              Walking


Bed/Chair/Wheelchair Transfers   5. Supervision (Needs supv. or set-up for


FIM Score                        sliding board, foot rests.)


Toilet Transfers FIM Score       4. Minimal Assistance (Patient = 75% or more.


                                 Needs touching.)





Locomotion- Stairs





Device used on Stairs            Handrail/s


Number of Stairs Ascended/       8


Descended                        


Patient used handrail/support:   Yes


Stairs FIM Score                 2. Maximal Assistance (Patient = 25% or more, 

4-


                                 6 stairs.)





Locomotion- walk/wheelchair





Most Frequent Mode of            Walking


Locomotion:                      


Ambulation Distance              150


Walking FIM Score                4. Minimal Assistance (Patient = 75% or more.


                                 Minimum of 150 ft.)


Wheelchair FIM Score             0. Activity does not occur





Eating





Eating FIM Score                 7. Complete Ness (Cuts meat, opens


                                 containers, regular diet.)





Dressing-Upper body





Patient retrieves clothing       No


items:                           


Patient applies/removes UE       No


prosthesis or orthosis:          


Upper Body Dressing FIM Score    5. Supv./Set-Up (Bernard sets out clothes or


                                 applies pros./orth.)





Dressing-lower body





Lower Body Dressing Device       Shoehorn,Sock Aid


Patient retrieves clothing       No


items:                           


Patient applies/removes LE       No


prosthesis or orthosis:          


Lower Body Dressing FIM Score    3. Moderate Assistance (Patient = 50% or more)











- Labs


CBC & Chem 7: 


                                 08/19/19 06:52





                                 08/19/19 06:52


Labs: 


                         Laboratory Results - last 72 hr











  08/16/19 08/17/19 08/17/19





  06:30 07:24 07:24


 


WBC   9.7 


 


RBC   4.99 


 


Hgb   14.7 


 


Hct   44.1 


 


MCV   88 


 


MCH   29 


 


MCHC   33 


 


RDW   14.8 


 


Plt Count   247 


 


Sodium    142


 


Potassium    4.4


 


Chloride    103.0


 


Carbon Dioxide    29


 


Anion Gap    14


 


BUN    16


 


Creatinine    0.7 L


 


Estimated GFR    > 60


 


BUN/Creatinine Ratio    23


 


Glucose    104 H


 


Calcium    9.6


 


Hepatitis A IgM Ab  Non-reactive  


 


Hep Bs Antigen  Non-reactive  


 


Hep B Core IgM Ab  Non-reactive  


 


Hepatitis C Antibody  Reactive A  














  08/18/19 08/18/19 08/19/19





  06:12 06:12 06:52


 


WBC  9.9   7.8


 


RBC  4.76   4.76


 


Hgb  14.1   14.4


 


Hct  41.9   41.4


 


MCV  88   87


 


MCH  30   30


 


MCHC  34   35 H


 


RDW  14.7   14.5


 


Plt Count  242   259


 


Sodium   141 


 


Potassium   4.3 


 


Chloride   101.8 


 


Carbon Dioxide   26 


 


Anion Gap   18 


 


BUN   17 


 


Creatinine   0.7 L 


 


Estimated GFR   > 60 


 


BUN/Creatinine Ratio   24 


 


Glucose   107 H 


 


Calcium   9.2 


 


Hepatitis A IgM Ab   


 


Hep Bs Antigen   


 


Hep B Core IgM Ab   


 


Hepatitis C Antibody   














  08/19/19





  06:52


 


WBC 


 


RBC 


 


Hgb 


 


Hct 


 


MCV 


 


MCH 


 


MCHC 


 


RDW 


 


Plt Count 


 


Sodium  142


 


Potassium  4.4


 


Chloride  102.7


 


Carbon Dioxide  28


 


Anion Gap  16


 


BUN  17


 


Creatinine  0.6 L


 


Estimated GFR  > 60


 


BUN/Creatinine Ratio  28


 


Glucose  108 H


 


Calcium  9.4


 


Hepatitis A IgM Ab 


 


Hep Bs Antigen 


 


Hep B Core IgM Ab 


 


Hepatitis C Antibody 














- Imaging and cardiology


CT scan - pelvis: report reviewed, image reviewed, other (CT RLE and Lumbar)


Venous US: report reviewed





Assessment and Plan


Status post L4 5 fusion: Pain poorly controlled currently will adjust pain 

medications and monitor for effect.  Patient is no longer seeing outpatient pain

specialist [facility].  We'll try to adjust his regimen for 

appropriate functional pain control.  Monitor incision site for any signs of 

bleeding or infection.  Staples are to be removed August 21.  Follow-up with Dr. Cruz in 2 weeks.  Monitor for any changes in neurological status including

saddle anesthesia.





Hypertension: Continue medications and adjust for normotension


Hyperlipidemia: Continue statin


Hepatitis C: Patient states that he was treated and has been cleared of this.  

We'll continue avoid medications with adverse effect on liver.


Depression and PTSD: Continue medications monitor for any adverse effects.  

Patient typically takes desvenlafaxine which we do not carry.  We'll substitute 

venlafaxine


Anxiety: continue Klonopin and monitor


Z73.6 ADL dysfunction: OT will work on improving ability to perform ADLs 

(including assistive devices) to increase independence and decrease caregiver 

burden and improve functional transfers and mobility training.


R26.2 Difficulty walking: PT will work on gait training and proper use of 

assistive devices and advance as appropriate to use of stairs and outside 

ambulation on uneven surfaces.


R26.81 Unsteadiness on feet: PT will work on improving static and dynamic 

sitting and standing balance as well as proper use of assistive devices to 

decrease risk of falls.


R26.89 Abnormality of gait: PT will work to improve safety and efficiency of 

gait through neuromotor training and gait training along with instruction on 

proper use of assistive devices.


M62.81 Muscle weakness: PT & OT will work on strengthening exercises to improve 

functional strength including mixture of closed and open kinetic chain 

exercises.


R53.81 Debility: PT & OT will work on improving overall functional status to 

improve participation with ADLs, mobility and social involvement.


R53.83 Fatigue: PT & OT will work on improving endurance through aerobic 

exercises and therapeutic activity while monitoring patients tolerance for 

activity and vital signs as needed.


 





DVT ppx: lovenox


Pain: Continue physical modalities in therapy and pain medications as needed to 

achieve functional pain control.  Adjust medications with scheduled dose of 

Roxicodone and breakthrough, Lidoderm patch at night, cross taper of gabapentin 

and Lyrica, short decadron taper started


Sleep: Monitor and address as needed. 


Bowel: Monitor and address as needed.  


Appetite: Monitor and address as needed.


Discharge planning: Pending therapy progress and care plan meeting. Will 

continue discussion with therapy team, SW, patient and family.





Restrictions/ Precautions:


Falls, pacemaker, spinal postsurgical precaution,, back brace on when out of bed





WB status: FWB





Functional Hx:


ADLs: Independent


Cognition: Independent


Mobility: Cane and at times felt like he could use a walker








Barriers to Discharge: Decreased mobility and ability to perform self care, 

balance deficits, weakness


Estimated Length of Stay: 1014 days


Discharge Destination: Home with family

## 2019-08-20 LAB
BUN SERPL-MCNC: 16 MG/DL (ref 9–20)
BUN/CREAT SERPL: 23 %
CALCIUM SERPL-MCNC: 9.7 MG/DL (ref 8.4–10.2)
HCT VFR BLD CALC: 45.3 % (ref 35.5–45.6)
HEMOLYSIS INDEX: 17
HGB BLD-MCNC: 15.1 GM/DL (ref 11.8–15.2)
MCHC RBC AUTO-ENTMCNC: 33 % (ref 32–34)
MCV RBC AUTO: 89 FL (ref 84–94)
PLATELET # BLD: 299 K/MM3 (ref 140–440)
RBC # BLD AUTO: 5.11 M/MM3 (ref 3.65–5.03)

## 2019-08-20 RX ADMIN — DEXAMETHASONE SODIUM PHOSPHATE SCH MG: 4 INJECTION, SOLUTION INTRAMUSCULAR; INTRAVENOUS at 06:11

## 2019-08-20 RX ADMIN — PREGABALIN SCH MG: 75 CAPSULE ORAL at 08:22

## 2019-08-20 RX ADMIN — VENLAFAXINE SCH MG: 37.5 TABLET ORAL at 08:23

## 2019-08-20 RX ADMIN — PREGABALIN SCH MG: 25 CAPSULE ORAL at 08:40

## 2019-08-20 RX ADMIN — DEXAMETHASONE SODIUM PHOSPHATE SCH: 4 INJECTION, SOLUTION INTRAMUSCULAR; INTRAVENOUS at 08:18

## 2019-08-20 RX ADMIN — DEXAMETHASONE SODIUM PHOSPHATE SCH: 4 INJECTION, SOLUTION INTRAMUSCULAR; INTRAVENOUS at 21:27

## 2019-08-20 RX ADMIN — GABAPENTIN SCH MG: 300 CAPSULE ORAL at 08:20

## 2019-08-20 RX ADMIN — TAMSULOSIN HYDROCHLORIDE SCH MG: 0.4 CAPSULE ORAL at 21:18

## 2019-08-20 RX ADMIN — DEXAMETHASONE SODIUM PHOSPHATE SCH MG: 4 INJECTION, SOLUTION INTRAMUSCULAR; INTRAVENOUS at 12:01

## 2019-08-20 RX ADMIN — DEXAMETHASONE SODIUM PHOSPHATE SCH MG: 4 INJECTION, SOLUTION INTRAMUSCULAR; INTRAVENOUS at 13:02

## 2019-08-20 RX ADMIN — Medication SCH MG: at 08:40

## 2019-08-20 RX ADMIN — OXYCODONE PRN MG: 5 TABLET ORAL at 03:26

## 2019-08-20 RX ADMIN — Medication SCH MCG: at 08:20

## 2019-08-20 RX ADMIN — DOCUSATE SODIUM SCH MG: 100 CAPSULE, LIQUID FILLED ORAL at 08:23

## 2019-08-20 RX ADMIN — PREGABALIN SCH MG: 75 CAPSULE ORAL at 21:18

## 2019-08-20 RX ADMIN — CARVEDILOL SCH MG: 3.12 TABLET, FILM COATED ORAL at 08:21

## 2019-08-20 RX ADMIN — OXYCODONE SCH MG: 5 TABLET ORAL at 06:07

## 2019-08-20 RX ADMIN — OXYCODONE SCH MG: 5 TABLET ORAL at 12:58

## 2019-08-20 RX ADMIN — DOCUSATE SODIUM SCH MG: 100 CAPSULE, LIQUID FILLED ORAL at 21:18

## 2019-08-20 RX ADMIN — NICOTINE SCH MG: 21 PATCH TRANSDERMAL at 08:23

## 2019-08-20 RX ADMIN — OXYCODONE SCH MG: 5 TABLET ORAL at 17:15

## 2019-08-20 RX ADMIN — ENOXAPARIN SODIUM SCH MG: 100 INJECTION SUBCUTANEOUS at 21:19

## 2019-08-20 RX ADMIN — ASPIRIN SCH MG: 81 TABLET, COATED ORAL at 08:20

## 2019-08-20 RX ADMIN — VENLAFAXINE SCH MG: 37.5 TABLET ORAL at 21:18

## 2019-08-20 RX ADMIN — PANTOPRAZOLE SODIUM SCH MG: 20 TABLET, DELAYED RELEASE ORAL at 08:23

## 2019-08-20 RX ADMIN — PREGABALIN SCH MG: 25 CAPSULE ORAL at 21:18

## 2019-08-20 RX ADMIN — OXYCODONE SCH MG: 5 TABLET ORAL at 23:37

## 2019-08-20 RX ADMIN — CARVEDILOL SCH MG: 3.12 TABLET, FILM COATED ORAL at 21:18

## 2019-08-20 RX ADMIN — FOLIC ACID SCH MG: 1 TABLET ORAL at 08:21

## 2019-08-20 RX ADMIN — LIDOCAINE SCH: 50 PATCH TOPICAL at 08:24

## 2019-08-20 RX ADMIN — CHOLECALCIFEROL TAB 10 MCG (400 UNIT) SCH UNIT: 10 TAB at 08:20

## 2019-08-20 RX ADMIN — DEXAMETHASONE SODIUM PHOSPHATE SCH MG: 4 INJECTION, SOLUTION INTRAMUSCULAR; INTRAVENOUS at 23:40

## 2019-08-20 NOTE — PROGRESS NOTE
Subjective


Date of service: 08/20/19


Principal diagnosis: s/p L4-5 fusion, decreased mobility and ADL


Interval history: 





68-year-old male with a history of previous lumbar neurogenic claudication and 

surgical fixation who experienced a return of symptoms with severe lumbar 

radicular pain.  He was evaluated by Dr. Cruz who determined that he had 

lumbar instability with subluxation of L4-L5 and would be a good candidate for 

lateral approach for surgical repair.  He underwent fusion on 8/12 with 

application of interbody cage and lateral plate and tolerated the procedure 

well.  He is to wear his back brace when out of bed.  He has a follow-up 

scheduled with Dr. Cruz.  After admission and was called in the evening 

and nursing noted his pain was poorly controlled.  Patient has a history of 

cirrhosis which is states that he underwent treatment for and has now been 

cleared by his hepatologist.  I discussed the issue of taking Tylenol which he 

was insistent upon doing earlier and he has agreed to avoid Tylenol.  We'll 

adjust his doses of medications with a short-term of scheduled Roxicodone along 

with Lidoderm patch.  He also stated he was abruptly taken off of gabapentin wh

en he was started on the Lyrica but due to his symptoms he started taking his 

home dose of gabapentin again.  While he is here we will perform a cross taper 

of gabapentin into Lyrica.  





Patient is participating in therapy and making progress.  He is getting up 

without calling for assistance.  Have discussed the danger of this with him.  

Requests lidocaine cream for groin and thigh. Continue short taper of decadron 

to calm down the inflammation, WBC elevated as expected.  No signs of infection.

He is aware of back brace requirements but is not always adherent.  Denies any 

other problem besides pain, which he describes as neuropathic in nature 

radiating from his back to front thigh and groin. Denies N/V, dyspnea, 

constipation, cough, chest pain or saddle anesthesia.





 


All records, vitals, labs and medications were reviewed.  No other issues per 

patient, nursing or therapy.








Objective





- Exam


Narrative Exam: 


MUSCULOSKELETAL SPECIALTY EXAM





CONSTITUTIONAL:


Well developed, well nourished, appropriately groomed, obese, NAD





EENT:


EOMI.   Hearing intact to soft voice





RESPIRATORY:


Clear to auscultation bilaterally, no increased work of breathing 





CARDIOVASCULAR:  


Regular Rate/ Rhythm, no swelling, edema or tenderness in BUE or BLE. All 

extremities warm.  





GI: 


+ bowel sounds, soft, NTTP, nondistended.





INTEGUMENTARY:


Normal, no lesion, rash, masses or bruising noted in extremities.  Surgical site

on her right flank intact with no signs of infection or drainage, staples 

intact.





MUSCULOSKELETAL:


BUE and BLE normal without defect, crepitus, subluxation, effusion, arthritic 

changes or TTP. 


BUE 4+/5, good ROM, with normal tone.  


LLE 4+/5 good ROM, with normal tone, RLE 4-/5 reduced ROM, with normal tone








NEURO:


CN 2-12 grossly intact. Sensation intact in all extremities.  No tremor noted in

4 extremities.   





POSTURE and GAIT:


Sitting posture good. Balance appears reasonable.  Able to take short amount of 

steps without overt loss of balance.





PSYCH:


Alert, oriented x3, affect appears normal. Insight appears intact.








- Constitutional


Vitals: 


                               Vital Signs - 12hr











  08/19/19 08/19/19 08/20/19





  22:00 23:56 03:26


 


Temperature   


 


Pulse Rate   


 


Respiratory  18 18





Rate   


 


Respiratory 18  





Rate [Right   





Thigh]   


 


Blood Pressure   


 


O2 Sat by Pulse   





Oximetry   














  08/20/19 08/20/19





  05:50 07:41


 


Temperature 36.6 C 36.7 C


 


Pulse Rate 66 72


 


Respiratory 17 18





Rate  


 


Respiratory  





Rate [Right  





Thigh]  


 


Blood Pressure 127/55 148/72


 


O2 Sat by Pulse 96 94





Oximetry  














- Allied health notes


Allied health notes reviewed: nursing, PT, OT


FIMS assessment as documented by PT/OT/ST: 


Grooming





Patient cleans teeth/dentures:   Yes


Patient iglesias/brushes hair:      Yes


Patient washes, rinses and       Yes


dries face:                      


Patient washes, rinses and       Yes


dries hands:                     


Patient shaves:                  No


Patient applies make-up:         No


Patient performs (no make-up/    4/4 (100%)


shaving):                        


Grooming FIM Score               5. Supervision ([place] applies toothpaste or


                                 opens containers.)





Toileting





Toileting Device                 Commode over Toilet,Grab Bar


Patient able to:                 Adjust clothes before,Clean self,Adjust clothes


                                 after


Patient able to perform:         3/3 (100%)


Toileting FIM Score              5. Supv./Set-Up (Needs stand-by, set-up,


                                 applying prosth/orth.)





Social interaction/Memory/Problem solving





Social Interaction FIM Score     5. Supervision (Needs supv. <10%. Needs


                                 encouragement to participate.)


Memory FIM Score                 5. Supervision (Needs cueing <10%, stressful/


                                 unfamiliar situations.)


Problem Solving FIM Score        5. Supervision (Needs cueing <10% to solve


                                 routine problems.)





Transfers





Mode of Locomotion:              Wheelchair


Bed/Chair/Wheelchair Transfers   5. Supervision (Needs supv. or set-up for


FIM Score                        sliding board, foot rests.)


Toilet Transfers FIM Score       4. Minimal Assistance (Patient = 75% or more.


                                 Needs touching.)





Locomotion- Stairs





Device used on Stairs            Handrail/s


Number of Stairs Ascended/       8


Descended                        


Patient used handrail/support:   Yes


Stairs FIM Score                 2. Maximal Assistance (Patient = 25% or more, 

4-


                                 6 stairs.)





Locomotion- walk/wheelchair





Most Frequent Mode of            Walking


Locomotion:                      


Ambulation Distance              150


Walking FIM Score                4. Minimal Assistance (Patient = 75% or more.


                                 Minimum of 150 ft.)


Wheelchair FIM Score             0. Activity does not occur





Eating





Eating FIM Score                 7. Complete [place] (Cuts meat, opens


                                 containers, regular diet.)





Dressing-Upper body





Patient retrieves clothing       No


items:                           


Patient applies/removes UE       No


prosthesis or orthosis:          


Upper Body Dressing FIM Score    5. Supv./Set-Up ([place] sets out clothes or


                                 applies pros./orth.)





Dressing-lower body





Lower Body Dressing Device       [place],Sock Aid


Patient retrieves clothing       No


items:                           


Patient applies/removes LE       No


prosthesis or orthosis:          


Lower Body Dressing FIM Score    3. Moderate Assistance (Patient = 50% or more)











- Labs


CBC & Chem 7: 


                                 08/21/19 07:02





                                 08/21/19 07:02


Labs: 


                         Laboratory Results - last 72 hr











  08/18/19 08/18/19 08/19/19





  06:12 06:12 06:52


 


WBC  9.9   7.8


 


RBC  4.76   4.76


 


Hgb  14.1   14.4


 


Hct  41.9   41.4


 


MCV  88   87


 


MCH  30   30


 


MCHC  34   35 H


 


RDW  14.7   14.5


 


Plt Count  242   259


 


Sodium   141 


 


Potassium   4.3 


 


Chloride   101.8 


 


Carbon Dioxide   26 


 


Anion Gap   18 


 


BUN   17 


 


Creatinine   0.7 L 


 


Estimated GFR   > 60 


 


BUN/Creatinine Ratio   24 


 


Glucose   107 H 


 


Calcium   9.2 














  08/19/19 08/20/19 08/20/19





  06:52 07:05 07:05


 


WBC   11.8 H 


 


RBC   5.11 H 


 


Hgb   15.1 


 


Hct   45.3 


 


MCV   89 


 


MCH   30 


 


MCHC   33 


 


RDW   14.6 


 


Plt Count   299 


 


Sodium  142   139


 


Potassium  4.4   5.0


 


Chloride  102.7   98.2


 


Carbon Dioxide  28   29


 


Anion Gap  16   17


 


BUN  17   16


 


Creatinine  0.6 L   0.7 L


 


Estimated GFR  > 60   > 60


 


BUN/Creatinine Ratio  28   23


 


Glucose  108 H   141 H


 


Calcium  9.4   9.7














Assessment and Plan


Status post L4 5 fusion: Pain poorly controlled currently will adjust pain 

medications and monitor for effect.  Patient is no longer seeing outpatient pain

specialist [facility]  We'll try to adjust his regimen for 

appropriate functional pain control.  Monitor incision site for any signs of 

bleeding or infection.  Staples are to be removed August 21.  Follow-up with 

 in 2 weeks.  Monitor for any changes in neurological status 

including saddle anesthesia.





Hypertension: Continue medications and adjust for normotension


Hyperlipidemia: Continue statin


Hepatitis C: Patient states that he was treated and has been cleared of this.  

We'll continue avoid medications with adverse effect on liver.


Depression and PTSD: Continue medications monitor for any adverse effects.  

Patient typically takes desvenlafaxine which we do not carry.  We'll substitute 

venlafaxine


Anxiety: continue Klonopin and monitor


Z73.6 ADL dysfunction: OT will work on improving ability to perform ADLs 

(including assistive devices) to increase independence and decrease caregiver 

burden and improve functional transfers and mobility training.


R26.2 Difficulty walking: PT will work on gait training and proper use of 

assistive devices and advance as appropriate to use of stairs and outside 

ambulation on uneven surfaces.


R26.81 Unsteadiness on feet: PT will work on improving static and dynamic 

sitting and standing balance as well as proper use of assistive devices to 

decrease risk of falls.


R26.89 Abnormality of gait: PT will work to improve safety and efficiency of 

gait through neuromotor training and gait training along with instruction on 

proper use of assistive devices.


M62.81 Muscle weakness: PT & OT will work on strengthening exercises to improve 

functional strength including mixture of closed and open kinetic chain 

exercises.


R53.81 Debility: PT & OT will work on improving overall functional status to 

improve participation with ADLs, mobility and social involvement.


R53.83 Fatigue: PT & OT will work on improving endurance through aerobic 

exercises and therapeutic activity while monitoring patients tolerance for 

activity and vital signs as needed.


 





DVT ppx: lovenox


Pain: Continue physical modalities in therapy and pain medications as needed to 

achieve functional pain control.  Adjust medications with scheduled dose of 

Roxicodone and breakthrough, Lidoderm patch at night, cross taper of gabapentin 

and Lyrica, short decadron taper started


Sleep: Monitor and address as needed. 


Bowel: Monitor and address as needed.  


Appetite: Monitor and address as needed.


Discharge planning: Pending therapy progress and care plan meeting. Will 

continue discussion with therapy team, SW, patient and family.





Restrictions/ Precautions:


Falls, pacemaker, spinal postsurgical precaution,, back brace on when out of bed





WB status: FWB





Functional Hx:


ADLs: Independent


Cognition: Independent


Mobility: Cane and at times felt like he could use a walker








Barriers to Discharge: Decreased mobility and ability to perform self care, 

balance deficits, weakness


Estimated Length of Stay: 1014 days


Discharge Destination: Home with family

## 2019-08-21 LAB
BUN SERPL-MCNC: 19 MG/DL (ref 9–20)
BUN/CREAT SERPL: 32 %
CALCIUM SERPL-MCNC: 9.6 MG/DL (ref 8.4–10.2)
HCT VFR BLD CALC: 42 % (ref 35.5–45.6)
HEMOLYSIS INDEX: 1
HGB BLD-MCNC: 14.5 GM/DL (ref 11.8–15.2)
MCHC RBC AUTO-ENTMCNC: 35 % (ref 32–34)
MCV RBC AUTO: 87 FL (ref 84–94)
PLATELET # BLD: 294 K/MM3 (ref 140–440)
RBC # BLD AUTO: 4.82 M/MM3 (ref 3.65–5.03)

## 2019-08-21 RX ADMIN — PREGABALIN SCH MG: 25 CAPSULE ORAL at 09:38

## 2019-08-21 RX ADMIN — PREGABALIN SCH MG: 75 CAPSULE ORAL at 21:20

## 2019-08-21 RX ADMIN — OXYCODONE SCH MG: 5 TABLET ORAL at 12:26

## 2019-08-21 RX ADMIN — ASPIRIN SCH MG: 81 TABLET, COATED ORAL at 09:39

## 2019-08-21 RX ADMIN — Medication SCH MG: at 09:58

## 2019-08-21 RX ADMIN — OXYCODONE PRN MG: 5 TABLET ORAL at 04:18

## 2019-08-21 RX ADMIN — OXYCODONE SCH MG: 5 TABLET ORAL at 18:16

## 2019-08-21 RX ADMIN — OXYCODONE PRN MG: 5 TABLET ORAL at 21:21

## 2019-08-21 RX ADMIN — ENOXAPARIN SODIUM SCH: 100 INJECTION SUBCUTANEOUS at 21:37

## 2019-08-21 RX ADMIN — CARVEDILOL SCH MG: 3.12 TABLET, FILM COATED ORAL at 21:19

## 2019-08-21 RX ADMIN — PANTOPRAZOLE SODIUM SCH MG: 20 TABLET, DELAYED RELEASE ORAL at 09:40

## 2019-08-21 RX ADMIN — GABAPENTIN SCH MG: 100 CAPSULE ORAL at 21:19

## 2019-08-21 RX ADMIN — LIDOCAINE SCH APPLIC: 5 OINTMENT TOPICAL at 21:36

## 2019-08-21 RX ADMIN — OXYCODONE PRN MG: 5 TABLET ORAL at 16:30

## 2019-08-21 RX ADMIN — VENLAFAXINE SCH MG: 37.5 TABLET ORAL at 21:20

## 2019-08-21 RX ADMIN — DEXAMETHASONE SODIUM PHOSPHATE SCH MG: 4 INJECTION, SOLUTION INTRAMUSCULAR; INTRAVENOUS at 09:42

## 2019-08-21 RX ADMIN — TAMSULOSIN HYDROCHLORIDE SCH MG: 0.4 CAPSULE ORAL at 21:20

## 2019-08-21 RX ADMIN — DOCUSATE SODIUM SCH MG: 100 CAPSULE, LIQUID FILLED ORAL at 09:38

## 2019-08-21 RX ADMIN — PREGABALIN SCH MG: 25 CAPSULE ORAL at 21:20

## 2019-08-21 RX ADMIN — Medication SCH MCG: at 09:39

## 2019-08-21 RX ADMIN — NICOTINE SCH MG: 21 PATCH TRANSDERMAL at 09:40

## 2019-08-21 RX ADMIN — CARVEDILOL SCH MG: 3.12 TABLET, FILM COATED ORAL at 12:25

## 2019-08-21 RX ADMIN — DEXAMETHASONE SODIUM PHOSPHATE SCH MG: 4 INJECTION, SOLUTION INTRAMUSCULAR; INTRAVENOUS at 18:16

## 2019-08-21 RX ADMIN — VENLAFAXINE SCH MG: 37.5 TABLET ORAL at 09:39

## 2019-08-21 RX ADMIN — LIDOCAINE SCH APPLIC: 5 OINTMENT TOPICAL at 12:30

## 2019-08-21 RX ADMIN — GABAPENTIN SCH MG: 100 CAPSULE ORAL at 09:37

## 2019-08-21 RX ADMIN — OXYCODONE SCH MG: 5 TABLET ORAL at 07:10

## 2019-08-21 RX ADMIN — Medication SCH UNIT: at 09:38

## 2019-08-21 RX ADMIN — FOLIC ACID SCH MG: 1 TABLET ORAL at 09:39

## 2019-08-21 RX ADMIN — LIDOCAINE SCH EACH: 50 PATCH TOPICAL at 09:40

## 2019-08-21 RX ADMIN — LIDOCAINE SCH: 50 PATCH TOPICAL at 09:59

## 2019-08-21 RX ADMIN — DOCUSATE SODIUM SCH MG: 100 CAPSULE, LIQUID FILLED ORAL at 21:19

## 2019-08-21 RX ADMIN — PREGABALIN SCH MG: 75 CAPSULE ORAL at 09:38

## 2019-08-21 NOTE — PROGRESS NOTE
Subjective


Date of service: 08/21/19


Principal diagnosis: s/p L4-5 fusion, decreased mobility and ADL


Interval history: 





68-year-old male with a history of previous lumbar neurogenic claudication and 

surgical fixation who experienced a return of symptoms with severe lumbar 

radicular pain.  He was evaluated by Dr. Cruz who determined that he had 

lumbar instability with subluxation of L4-L5 and would be a good candidate for 

lateral approach for surgical repair.  He underwent fusion on 8/12 with 

application of interbody cage and lateral plate and tolerated the procedure 

well.  He is to wear his back brace when out of bed.  He has a follow-up 

scheduled with Dr. Cruz.  After admission and was called in the evening 

and nursing noted his pain was poorly controlled.  Patient has a history of 

cirrhosis which is states that he underwent treatment for and has now been 

cleared by his hepatologist.  I discussed the issue of taking Tylenol which he 

was insistent upon doing earlier and he has agreed to avoid Tylenol.  We'll 

adjust his doses of medications with a short-term of scheduled Roxicodone along 

with Lidoderm patch.  He also stated he was abruptly taken off of gabapentin wh

en he was started on the Lyrica but due to his symptoms he started taking his 

home dose of gabapentin again.  While he is here we will perform a cross taper 

of gabapentin into Lyrica.  





Patient is participating in therapy and making progress.  He is getting up 

without calling for assistance.  Have discussed the danger of this with him.  

Continue short taper of decadron to calm down the inflammation, WBC elevated as 

expected.  No signs of infection. He is aware of back brace requirements and is 

showing better adherence.  Denies any other problem besides pain, which he 

describes as neuropathic in nature radiating from his back to front thigh and 

groin. Denies N/V, dyspnea, constipation, cough, chest pain or saddle 

anesthesia.





 


All records, vitals, labs and medications were reviewed.  No other issues per 

patient, nursing or therapy.








Objective





- Exam


Narrative Exam: 


MUSCULOSKELETAL SPECIALTY EXAM





CONSTITUTIONAL:


Well developed, well nourished, appropriately groomed, obese, NAD





EENT:


EOMI.   Hearing intact to soft voice





RESPIRATORY:


Clear to auscultation bilaterally, no increased work of breathing 





CARDIOVASCULAR:  


Regular Rate/ Rhythm, no swelling, edema or tenderness in BUE or BLE. All 

extremities warm.  





GI: 


+ bowel sounds, soft, NTTP, nondistended.





INTEGUMENTARY:


Normal, no lesion, rash, masses or bruising noted in extremities.  Surgical site

on her right flank intact with no signs of infection or drainage, staples 

intact.





MUSCULOSKELETAL:


BUE and BLE normal without defect, crepitus, subluxation, effusion, arthritic 

changes or TTP. 


BUE 4+/5, good ROM, with normal tone.  


LLE 4+/5 good ROM, with normal tone, RLE 4-/5 reduced ROM, with normal tone








NEURO:


CN 2-12 grossly intact. Sensation intact in all extremities.  No tremor noted in

4 extremities.   





POSTURE and GAIT:


Sitting posture good. Balance appears reasonable.  Able to ambulate without 

overt loss of balance.





PSYCH:


Alert, oriented x3, affect appears normal. Insight appears intact.








- Constitutional


Vitals: 


                               Vital Signs - 12hr











  08/21/19





  07:18


 


Temperature 36.7 C


 


Pulse Rate 63


 


Respiratory 20





Rate 


 


Blood Pressure 122/56


 


O2 Sat by Pulse 95





Oximetry 














- Allied health notes


Allied health notes reviewed: nursing, PT, OT


FIMS assessment as documented by PT/OT/ST: 


Grooming





Patient cleans teeth/dentures:   Yes


Patient iglesias/brushes hair:      Yes


Patient washes, rinses and       Yes


dries face:                      


Patient washes, rinses and       Yes


dries hands:                     


Patient shaves:                  No


Patient applies make-up:         No


Patient performs (no make-up/    4/4 (100%)


shaving):                        


Grooming FIM Score               6. Modified [place] (Needs 

equipment/device


                                 . Extra time.)





Toileting





Toileting Device                 Commode over Toilet,Grab Bar


Patient able to:                 Adjust clothes before,Clean self,Adjust clothes


                                 after


Patient able to perform:         3/3 (100%)


Toileting FIM Score              6. Modified [place] (Needs equip. or 

prosth


                                 ./orth.)





Social interaction/Memory/Problem solving





Social Interaction FIM Score     5. Supervision (Needs supv. <10%. Needs


                                 encouragement to participate.)


Memory FIM Score                 5. Supervision (Needs cueing <10%, stressful/


                                 unfamiliar situations.)


Problem Solving FIM Score        5. Supervision (Needs cueing <10% to solve


                                 routine problems.)





Transfers





Mode of Locomotion:              Wheelchair


Bed/Chair/Wheelchair Transfers   5. Supervision (Needs supv. or set-up for


FIM Score                        sliding board, foot rests.)


Toilet Transfers FIM Score       5. Supervision (Needs supervision or cueing.)





Locomotion- Stairs





Device used on Stairs            Handrail/s


Number of Stairs Ascended/       8


Descended                        


Patient used handrail/support:   Yes


Stairs FIM Score                 2. Maximal Assistance (Patient = 25% or more, 

4-


                                 6 stairs.)





Locomotion- walk/wheelchair





Most Frequent Mode of            Walking


Locomotion:                      


Ambulation Distance              150


Walking FIM Score                4. Minimal Assistance (Patient = 75% or more.


                                 Minimum of 150 ft.)


Wheelchair Propulsion Distance   150


Wheelchair FIM Score             5. Supervision (Minimum 150 ft. supv./cues or 

50


                                 ft. independently.)





Eating





Eating FIM Score                 7. Complete [place] (Cuts meat, opens


                                 containers, regular diet.)





Dressing-Upper body





Patient retrieves clothing       No


items:                           


Patient applies/removes UE       No


prosthesis or orthosis:          


Upper Body Dressing FIM Score    5. Supv./Set-Up ([place] sets out clothes or


                                 applies pros./orth.)





Dressing-lower body





Lower Body Dressing Device       [place],Sock Aid


Patient retrieves clothing       No


items:                           


Patient applies/removes LE       No


prosthesis or orthosis:          


Lower Body Dressing FIM Score    5. Supv./Set-Up ([place] sets out clothes or


                                 applies pros./orth.)











- Labs


CBC & Chem 7: 


                                 08/21/19 07:02





                                 08/21/19 07:02


Labs: 


                         Laboratory Results - last 72 hr











  08/19/19 08/19/19 08/20/19





  06:52 06:52 07:05


 


WBC  7.8   11.8 H


 


RBC  4.76   5.11 H


 


Hgb  14.4   15.1


 


Hct  41.4   45.3


 


MCV  87   89


 


MCH  30   30


 


MCHC  35 H   33


 


RDW  14.5   14.6


 


Plt Count  259   299


 


Sodium   142 


 


Potassium   4.4 


 


Chloride   102.7 


 


Carbon Dioxide   28 


 


Anion Gap   16 


 


BUN   17 


 


Creatinine   0.6 L 


 


Estimated GFR   > 60 


 


BUN/Creatinine Ratio   28 


 


Glucose   108 H 


 


Calcium   9.4 














  08/20/19 08/21/19 08/21/19





  07:05 07:02 07:02


 


WBC   16.5 H 


 


RBC   4.82 


 


Hgb   14.5 


 


Hct   42.0 


 


MCV   87 


 


MCH   30 


 


MCHC   35 H 


 


RDW   14.3 


 


Plt Count   294 


 


Sodium  139   141


 


Potassium  5.0   4.4


 


Chloride  98.2   103.1


 


Carbon Dioxide  29   26


 


Anion Gap  17   16


 


BUN  16   19


 


Creatinine  0.7 L   0.6 L


 


Estimated GFR  > 60   > 60


 


BUN/Creatinine Ratio  23   32


 


Glucose  141 H   142 H


 


Calcium  9.7   9.6














Assessment and Plan


Status post L4 5 fusion: Pain poorly controlled currently will adjust pain 

medications and monitor for effect.  Patient is no longer seeing outpatient pain

specialist [facility].  We'll try to adjust his regimen for 

appropriate functional pain control.  Monitor incision site for any signs of 

bleeding or infection.  Staples are to be removed August 21.  Follow-up with 

 in 2 weeks.  Monitor for any changes in neurological status 

including saddle anesthesia.





Hypertension: Continue medications and adjust for normotension


Hyperlipidemia: Continue statin


Hepatitis C: Patient states that he was treated and has been cleared of this.  

We'll continue avoid medications with adverse effect on liver.


Depression and PTSD: Continue medications monitor for any adverse effects.  

Patient typically takes desvenlafaxine which we do not carry.  We'll substitute 

venlafaxine


Anxiety: continue Klonopin and monitor


Z73.6 ADL dysfunction: OT will work on improving ability to perform ADLs 

(including assistive devices) to increase independence and decrease caregiver 

burden and improve functional transfers and mobility training.


R26.2 Difficulty walking: PT will work on gait training and proper use of 

assistive devices and advance as appropriate to use of stairs and outside 

ambulation on uneven surfaces.


R26.81 Unsteadiness on feet: PT will work on improving static and dynamic 

sitting and standing balance as well as proper use of assistive devices to 

decrease risk of falls.


R26.89 Abnormality of gait: PT will work to improve safety and efficiency of 

gait through neuromotor training and gait training along with instruction on 

proper use of assistive devices.


M62.81 Muscle weakness: PT & OT will work on strengthening exercises to improve 

functional strength including mixture of closed and open kinetic chain 

exercises.


R53.81 Debility: PT & OT will work on improving overall functional status to 

improve participation with ADLs, mobility and social involvement.


R53.83 Fatigue: PT & OT will work on improving endurance through aerobic 

exercises and therapeutic activity while monitoring patients tolerance for 

activity and vital signs as needed.


 





DVT ppx: lovenox


Pain: Continue physical modalities in therapy and pain medications as needed to 

achieve functional pain control.  Adjust medications with scheduled dose of 

Roxicodone and breakthrough, Lidoderm patch at night, cross taper of gabapentin 

and Lyrica, short decadron taper started


Sleep: Monitor and address as needed. 


Bowel: Monitor and address as needed.  


Appetite: Monitor and address as needed.


Discharge planning: Pending therapy progress and care plan meeting. Will 

continue discussion with therapy team, SW, patient and family.





Restrictions/ Precautions:


Falls, pacemaker, spinal postsurgical precaution,, back brace on when out of bed





WB status: FWB





Functional Hx:


ADLs: Independent


Cognition: Independent


Mobility: Cane and at times felt like he could use a walker








Barriers to Discharge: Decreased mobility and ability to perform self care, 

balance deficits, weakness


Estimated Length of Stay: 1014 days


Discharge Destination: Home with family

## 2019-08-22 LAB
BUN SERPL-MCNC: 19 MG/DL (ref 9–20)
BUN/CREAT SERPL: 27 %
CALCIUM SERPL-MCNC: 9.6 MG/DL (ref 8.4–10.2)
HCT VFR BLD CALC: 44.5 % (ref 35.5–45.6)
HEMOLYSIS INDEX: 21
HGB BLD-MCNC: 14.7 GM/DL (ref 11.8–15.2)
MCHC RBC AUTO-ENTMCNC: 33 % (ref 32–34)
MCV RBC AUTO: 88 FL (ref 84–94)
PLATELET # BLD: 334 K/MM3 (ref 140–440)
RBC # BLD AUTO: 5.07 M/MM3 (ref 3.65–5.03)

## 2019-08-22 RX ADMIN — PREGABALIN SCH MG: 25 CAPSULE ORAL at 21:58

## 2019-08-22 RX ADMIN — ENOXAPARIN SODIUM SCH MG: 100 INJECTION SUBCUTANEOUS at 21:59

## 2019-08-22 RX ADMIN — FOLIC ACID SCH MG: 1 TABLET ORAL at 09:30

## 2019-08-22 RX ADMIN — Medication SCH MCG: at 09:31

## 2019-08-22 RX ADMIN — OXYCODONE SCH MG: 5 TABLET ORAL at 00:22

## 2019-08-22 RX ADMIN — DEXAMETHASONE SODIUM PHOSPHATE SCH MG: 4 INJECTION, SOLUTION INTRAMUSCULAR; INTRAVENOUS at 00:18

## 2019-08-22 RX ADMIN — CARVEDILOL SCH MG: 3.12 TABLET, FILM COATED ORAL at 09:30

## 2019-08-22 RX ADMIN — PREGABALIN SCH MG: 75 CAPSULE ORAL at 21:58

## 2019-08-22 RX ADMIN — GABAPENTIN SCH MG: 100 CAPSULE ORAL at 21:57

## 2019-08-22 RX ADMIN — DOCUSATE SODIUM SCH MG: 100 CAPSULE, LIQUID FILLED ORAL at 21:58

## 2019-08-22 RX ADMIN — GABAPENTIN SCH MG: 100 CAPSULE ORAL at 09:30

## 2019-08-22 RX ADMIN — Medication SCH UNIT: at 09:31

## 2019-08-22 RX ADMIN — OXYCODONE PRN MG: 5 TABLET ORAL at 16:23

## 2019-08-22 RX ADMIN — OXYCODONE SCH MG: 5 TABLET ORAL at 12:08

## 2019-08-22 RX ADMIN — PREGABALIN SCH MG: 25 CAPSULE ORAL at 09:29

## 2019-08-22 RX ADMIN — OXYCODONE SCH: 5 TABLET ORAL at 00:18

## 2019-08-22 RX ADMIN — NICOTINE SCH MG: 21 PATCH TRANSDERMAL at 09:29

## 2019-08-22 RX ADMIN — DEXAMETHASONE SODIUM PHOSPHATE SCH MG: 4 INJECTION, SOLUTION INTRAMUSCULAR; INTRAVENOUS at 12:06

## 2019-08-22 RX ADMIN — LIDOCAINE SCH APPLIC: 5 OINTMENT TOPICAL at 22:00

## 2019-08-22 RX ADMIN — OXYCODONE SCH MG: 5 TABLET ORAL at 18:25

## 2019-08-22 RX ADMIN — PREGABALIN SCH MG: 25 CAPSULE ORAL at 14:57

## 2019-08-22 RX ADMIN — OXYCODONE SCH MG: 5 TABLET ORAL at 06:14

## 2019-08-22 RX ADMIN — VENLAFAXINE SCH MG: 37.5 TABLET ORAL at 09:31

## 2019-08-22 RX ADMIN — LIDOCAINE SCH APPLIC: 5 OINTMENT TOPICAL at 12:09

## 2019-08-22 RX ADMIN — CARVEDILOL SCH: 3.12 TABLET, FILM COATED ORAL at 21:59

## 2019-08-22 RX ADMIN — TAMSULOSIN HYDROCHLORIDE SCH MG: 0.4 CAPSULE ORAL at 21:59

## 2019-08-22 RX ADMIN — PANTOPRAZOLE SODIUM SCH MG: 20 TABLET, DELAYED RELEASE ORAL at 09:31

## 2019-08-22 RX ADMIN — Medication SCH MG: at 09:29

## 2019-08-22 RX ADMIN — PREGABALIN SCH MG: 75 CAPSULE ORAL at 09:29

## 2019-08-22 RX ADMIN — VENLAFAXINE SCH MG: 37.5 TABLET ORAL at 21:58

## 2019-08-22 RX ADMIN — DOCUSATE SODIUM SCH MG: 100 CAPSULE, LIQUID FILLED ORAL at 09:30

## 2019-08-22 RX ADMIN — ASPIRIN SCH MG: 81 TABLET, COATED ORAL at 09:30

## 2019-08-22 RX ADMIN — PREGABALIN SCH MG: 75 CAPSULE ORAL at 14:57

## 2019-08-22 NOTE — PROGRESS NOTE
Subjective


Date of service: 08/22/19


Principal diagnosis: s/p L4-5 fusion, decreased mobility and ADL


Interval history: 





68-year-old male with a history of previous lumbar neurogenic claudication and 

surgical fixation who experienced a return of symptoms with severe lumbar 

radicular pain.  He was evaluated by Dr. Cruz who determined that he had 

lumbar instability with subluxation of L4-L5 and would be a good candidate for 

lateral approach for surgical repair.  He underwent fusion on 8/12 with 

application of interbody cage and lateral plate and tolerated the procedure 

well.  He is to wear his back brace when out of bed.  He has a follow-up 

scheduled with Dr. Cruz.  After admission and was called in the evening 

and nursing noted his pain was poorly controlled.  Patient has a history of 

cirrhosis which is states that he underwent treatment for and has now been 

cleared by his hepatologist.  I discussed the issue of taking Tylenol which he 

was insistent upon doing earlier and he has agreed to avoid Tylenol.  We'll 

adjust his doses of medications with a short-term of scheduled Roxicodone along 

with Lidoderm patch.  He also stated he was abruptly taken off of gabapentin wh

en he was started on the Lyrica but due to his symptoms he started taking his 

home dose of gabapentin again.  While he is here we will perform a cross taper 

of gabapentin into Lyrica.  





Patient is participating in therapy and making progress.  He is getting up 

without calling for assistance.  Have discussed the danger of this with him.  

Continue short taper of decadron to calm down the inflammation, WBC elevated as 

expected.  No signs of infection. He is aware of back brace requirements and is 

showing better adherence.  Denies any other problem besides pain, which he 

describes as neuropathic in nature radiating from his back to front thigh and 

groin. Denies N/V, dyspnea, constipation, cough, chest pain or saddle 

anesthesia.  Since we are moving towards discharge, he will need to follow-up 

with a set of labs later next week to ensure that his white count has normalized

after coming off of Decadron.





Discussed in team conference.  Patient is making good progress other than his 

neuropathic pain in the right hip and groin.  He is walking around independently

and including getting on therapy equipment independently at night.  All of us 

have discussed this with the patient and told him that is not safe however he 

continues to do it.  He states that his surgeon told him to walk as much as 

possible and that is what he is doing.  Fortunately he is maintaining his LSO.  

He seems to be more stable with the rolling walker than without it at this point

area he was trialed on a quad cane but was not as stable with that as he has 

with the walker.  For safety we will discharge him with a walker.  We plan to 

discharge him tomorrow due to his ability to independently perform ADLs and to a

mbulate without any apparent problems currently.  Will have home health 

available for a short period to ensure that he transitions well home without any

issues.


  


All records, vitals, labs and medications were reviewed.  No other issues per 

patient, nursing or therapy.








Objective





- Exam


Narrative Exam: 


MUSCULOSKELETAL SPECIALTY EXAM





CONSTITUTIONAL:


Well developed, well nourished, appropriately groomed, obese, NAD





EENT:


EOMI.   Hearing intact to soft voice





RESPIRATORY:


Clear to auscultation bilaterally, no increased work of breathing 





CARDIOVASCULAR:  


Regular Rate/ Rhythm, no swelling, edema or tenderness in BUE or BLE. All 

extremities warm.  





GI: 


+ bowel sounds, soft, NTTP, nondistended.





INTEGUMENTARY:


Normal, no lesion, rash, masses or bruising noted in extremities.  Surgical site

on her right flank intact with no signs of infection or drainage, staples 

removed.





MUSCULOSKELETAL:


BUE and BLE normal without defect, crepitus, subluxation, effusion, arthritic 

changes or TTP. 


BUE 4+/5, good ROM, with normal tone.  


LLE 4+/5 good ROM, with normal tone, RLE 4-/5 reduced ROM, with normal tone








NEURO:


CN 2-12 grossly intact. Sensation intact in all extremities.  No tremor noted in

4 extremities.   





POSTURE and GAIT:


Sitting posture good. Balance appears reasonable.  Able to ambulate without 

overt loss of balance.





PSYCH:


Alert, oriented x3, affect appears normal. Insight appears intact.  Decreased 

compliance with instructions.








- Constitutional


Vitals: 


                               Vital Signs - 12hr











  08/21/19 08/21/19 08/22/19





  19:49 21:19 00:22


 


Temperature 36.6 C  


 


Pulse Rate 62 62 


 


Respiratory 18  18





Rate   


 


Blood Pressure 120/61 120/61 


 


Blood Pressure   





[Left]   


 


O2 Sat by Pulse 93  





Oximetry   














  08/22/19 08/22/19





  04:34 06:14


 


Temperature 37.1 C 


 


Pulse Rate 57 L 


 


Respiratory 19 18





Rate  


 


Blood Pressure  


 


Blood Pressure 100/45 





[Left]  


 


O2 Sat by Pulse 94 





Oximetry  














- Allied health notes


Allied health notes reviewed: nursing, PT, OT


FIMS assessment as documented by PT/OT/ST: 


Grooming





Patient cleans teeth/dentures:   Yes


Patient iglesias/brushes hair:      Yes


Patient washes, rinses and       Yes


dries face:                      


Patient washes, rinses and       Yes


dries hands:                     


Patient shaves:                  No


Patient applies make-up:         No


Patient performs (no make-up/    4/4 (100%)


shaving):                        


Grooming FIM Score               6. Modified [place] (Needs equipm

ent/device


                                 . Extra time.)





Toileting





Toileting Device                 Commode over Toilet,Grab Bar


Patient able to:                 Adjust clothes before,Clean self,Adjust clothes


                                 after


Patient able to perform:         3/3 (100%)


Toileting FIM Score              6. Modified Robeson (Needs equip. or 

prosth


                                 ./orth.)





Social interaction/Memory/Problem solving





Social Interaction FIM Score     6. Mod. Robeson (Mostly appropriate. May


                                 need meds. No supv.)


Memory FIM Score                 6. Modified Robeson(Mild difficulty


                                 remembering people/routines.)


Problem Solving FIM Score        6. Mod. Robeson (Mild difficulty or needs


                                 more time w/ complex.)





Transfers





Mode of Locomotion:              Walking


Bed/Chair/Wheelchair Transfers   5. Supervision (Needs supv. or set-up for


FIM Score                        sliding board, foot rests.)


Toilet Transfers FIM Score       5. Supervision (Needs supervision or cueing.)





Locomotion- Stairs





Device used on Stairs            Handrail/s


Number of Stairs Ascended/       12


Descended                        


Patient used handrail/support:   Yes


Stairs FIM Score                 5. Supervision (12-14 stairs w/ supv. 4-6 

stairs


                                 independently.)





Locomotion- walk/wheelchair





Most Frequent Mode of            Walking


Locomotion:                      


Ambulation Distance              340


Walking FIM Score                5. Supervision (Minimum 150 ft. supv./cues or 

50


                                 ft. independently.)


Wheelchair Propulsion Distance   200


Wheelchair FIM Score             6. Modified Robeson (Wheels a minimum of


                                 150 ft.)





Eating





Eating FIM Score                 6. Modified Robeson (Special consistency 

or


                                 uses device.)





Dressing-Upper body





Patient retrieves clothing       Yes


items:                           


Patient applies/removes UE       No


prosthesis or orthosis:          


Upper Body Dressing FIM Score    6. Modified Robeson (Needs equipment,


                                 velcro or pros./orth.)





Dressing-lower body





Lower Body Dressing Device       [place],Sock Aid


Patient retrieves clothing       Yes


items:                           


Patient applies/removes LE       No


prosthesis or orthosis:          


Lower Body Dressing FIM Score    5. Supv./Set-Up ([place] sets out clothes or


                                 applies pros./orth.)











- Labs


CBC & Chem 7: 


                                 08/22/19 10:21





                                 08/22/19 10:21


Labs: 


                         Laboratory Results - last 72 hr











  08/19/19 08/19/19 08/20/19





  06:52 06:52 07:05


 


WBC  7.8   11.8 H


 


RBC  4.76   5.11 H


 


Hgb  14.4   15.1


 


Hct  41.4   45.3


 


MCV  87   89


 


MCH  30   30


 


MCHC  35 H   33


 


RDW  14.5   14.6


 


Plt Count  259   299


 


Sodium   142 


 


Potassium   4.4 


 


Chloride   102.7 


 


Carbon Dioxide   28 


 


Anion Gap   16 


 


BUN   17 


 


Creatinine   0.6 L 


 


Estimated GFR   > 60 


 


BUN/Creatinine Ratio   28 


 


Glucose   108 H 


 


Calcium   9.4 














  08/20/19 08/21/19 08/21/19





  07:05 07:02 07:02


 


WBC   16.5 H 


 


RBC   4.82 


 


Hgb   14.5 


 


Hct   42.0 


 


MCV   87 


 


MCH   30 


 


MCHC   35 H 


 


RDW   14.3 


 


Plt Count   294 


 


Sodium  139   141


 


Potassium  5.0   4.4


 


Chloride  98.2   103.1


 


Carbon Dioxide  29   26


 


Anion Gap  17   16


 


BUN  16   19


 


Creatinine  0.7 L   0.6 L


 


Estimated GFR  > 60   > 60


 


BUN/Creatinine Ratio  23   32


 


Glucose  141 H   142 H


 


Calcium  9.7   9.6














Assessment and Plan


Status post L4 5 fusion: Pain poorly controlled currently will adjust pain 

medications and monitor for effect.  Patient is no longer seeing outpatient pain

 [facility].  We'll try to adjust his regimen for 

appropriate functional pain control.  Monitor incision site for any signs of 

bleeding or infection.  Staples removed August 21.  Follow-up with 

 in 2 weeks-appointment has been made.  Monitor for any changes in neurological 

status including saddle anesthesia.





Hypertension: Continue medications and adjust for normotension


Hyperlipidemia: Continue statin


Hepatitis C: Patient states that he was treated and has been cleared of this.  

We'll continue avoid medications with adverse effect on liver.


Depression and PTSD: Continue medications monitor for any adverse effects.  

Patient typically takes desvenlafaxine which we do not carry.  We'll substitute 

venlafaxine


Anxiety: continue Klonopin and monitor


Z73.6 ADL dysfunction: OT will work on improving ability to perform ADLs 

(including assistive devices) to increase independence and decrease caregiver 

burden and improve functional transfers and mobility training.


R26.2 Difficulty walking: PT will work on gait training and proper use of 

assistive devices and advance as appropriate to use of stairs and outside 

ambulation on uneven surfaces.


R26.81 Unsteadiness on feet: PT will work on improving static and dynamic 

sitting and standing balance as well as proper use of assistive devices to 

decrease risk of falls.


R26.89 Abnormality of gait: PT will work to improve safety and efficiency of 

gait through neuromotor training and gait training along with instruction on 

proper use of assistive devices.


M62.81 Muscle weakness: PT & OT will work on strengthening exercises to improve 

functional strength including mixture of closed and open kinetic chain 

exercises.


R53.81 Debility: PT & OT will work on improving overall functional status to 

improve participation with ADLs, mobility and social involvement.


R53.83 Fatigue: PT & OT will work on improving endurance through aerobic 

exercises and therapeutic activity while monitoring patients tolerance for 

activity and vital signs as needed.


 





DVT ppx: lovenox


Pain: Continue physical modalities in therapy and pain medications as needed to 

achieve functional pain control.  Adjust medications with scheduled dose of 

Roxicodone and breakthrough, cross taper of gabapentin and Lyrica, short 

decadron taper ending today.  Uncertain how much is actually helped with this 

pain as he tends to describe it differently at times.  He has not seemed to be 

in any acute distress at any point during his admission.


Sleep: Monitor and address as needed. 


Bowel: Monitor and address as needed.  


Appetite: Monitor and address as needed.


Discharge planning: Pending therapy progress and care plan meeting. Will 

continue discussion with therapy team, SW, patient and family.





Restrictions/ Precautions:


Falls, pacemaker, spinal postsurgical precaution,, back brace on when out of bed





WB status: FWB





Functional Hx:


ADLs: Independent


Cognition: Independent


Mobility: Cane and at times felt like he could use a walker








Barriers to Discharge: Decreased mobility and ability to perform self care, 

balance deficits, weakness


Estimated Length of Stay: 1014 days


Discharge Destination: Home with family

## 2019-08-23 VITALS — SYSTOLIC BLOOD PRESSURE: 105 MMHG | DIASTOLIC BLOOD PRESSURE: 64 MMHG

## 2019-08-23 LAB
BUN SERPL-MCNC: 21 MG/DL (ref 9–20)
BUN/CREAT SERPL: 30 %
CALCIUM SERPL-MCNC: 9.2 MG/DL (ref 8.4–10.2)
HCT VFR BLD CALC: 41.1 % (ref 35.5–45.6)
HEMOLYSIS INDEX: 171
HGB BLD-MCNC: 13.9 GM/DL (ref 11.8–15.2)
MCHC RBC AUTO-ENTMCNC: 34 % (ref 32–34)
MCV RBC AUTO: 88 FL (ref 84–94)
PLATELET # BLD: 278 K/MM3 (ref 140–440)
RBC # BLD AUTO: 4.67 M/MM3 (ref 3.65–5.03)

## 2019-08-23 RX ADMIN — Medication SCH UNIT: at 11:15

## 2019-08-23 RX ADMIN — DOCUSATE SODIUM SCH MG: 100 CAPSULE, LIQUID FILLED ORAL at 08:10

## 2019-08-23 RX ADMIN — PREGABALIN SCH MG: 25 CAPSULE ORAL at 08:12

## 2019-08-23 RX ADMIN — PREGABALIN SCH MG: 75 CAPSULE ORAL at 08:12

## 2019-08-23 RX ADMIN — ASPIRIN SCH MG: 81 TABLET, COATED ORAL at 08:12

## 2019-08-23 RX ADMIN — CARVEDILOL SCH MG: 3.12 TABLET, FILM COATED ORAL at 08:10

## 2019-08-23 RX ADMIN — OXYCODONE SCH MG: 5 TABLET ORAL at 11:05

## 2019-08-23 RX ADMIN — PANTOPRAZOLE SODIUM SCH MG: 20 TABLET, DELAYED RELEASE ORAL at 08:14

## 2019-08-23 RX ADMIN — NICOTINE SCH MG: 21 PATCH TRANSDERMAL at 08:11

## 2019-08-23 RX ADMIN — LIDOCAINE SCH APPLIC: 5 OINTMENT TOPICAL at 08:16

## 2019-08-23 RX ADMIN — GABAPENTIN SCH MG: 100 CAPSULE ORAL at 08:14

## 2019-08-23 RX ADMIN — DEXAMETHASONE SODIUM PHOSPHATE SCH MG: 4 INJECTION, SOLUTION INTRAMUSCULAR; INTRAVENOUS at 00:26

## 2019-08-23 RX ADMIN — VENLAFAXINE SCH MG: 37.5 TABLET ORAL at 08:11

## 2019-08-23 RX ADMIN — OXYCODONE SCH MG: 5 TABLET ORAL at 06:15

## 2019-08-23 RX ADMIN — OXYCODONE PRN MG: 5 TABLET ORAL at 04:26

## 2019-08-23 RX ADMIN — OXYCODONE SCH MG: 5 TABLET ORAL at 00:27

## 2019-08-23 RX ADMIN — FOLIC ACID SCH MG: 1 TABLET ORAL at 08:11

## 2019-08-23 RX ADMIN — Medication SCH MG: at 08:14

## 2019-08-23 RX ADMIN — Medication SCH MCG: at 08:14

## 2019-08-23 NOTE — DISCHARGE SUMMARY
Providers





- Providers


Date of Admission: 


08/15/19 16:38





Date of discharge: 08/23/19


Attending physician: 


AYE HAGAN III, MD





                                        





08/15/19 14:49


Occupational Therapy Evaluate and Treat [CONS] Routine 


   Comment: 


   Reason For Exam: ADL dysfunction


Physical Therapy Evaluation and Treat [CONS] Routine 


   Comment: 


   Reason For Exam: Mobility Dysfunction





08/15/19 15:04


Consult to Case Management [CONS] Routine 


   Services Needed at Discharge: Home Health Services


   Notified:: 











Primary care physician: 


PRIMARY CARE MD








Hospitalization


Reason for admission: s/p L4-5 fusion, decreased mobility and ADL


Condition: Good


Pertinent studies: 


US RLE - no DVT


CT Pelvis - no acute injury


CT Lumbar - no acute injury, surgical changes, possible mild stenosis, facet 

arthropaty


CT RLE - no acute changes





Hospital course: 


68-year-old male with a history of previous lumbar neurogenic claudication and 

surgical fixation who experienced a return of symptoms with severe lumbar 

radicular pain.  He was evaluated by Dr. Cruz who determined that he had 

lumbar instability with subluxation of L4-L5 and would be a good candidate for 

lateral approach for surgical repair.  He underwent fusion on 8/12 with 

application of interbody cage and lateral plate and tolerated the procedure 

well.  He is to wear his back brace when out of bed.  He has a follow-up 

scheduled with Dr. Cruz.  After admission and was called in the evening 

and nursing noted his pain was poorly controlled.  Patient has a history of 

cirrhosis which is states that he underwent treatment for and has now been 

cleared by his hepatologist.  I discussed the issue of taking Tylenol which he 

was insistent upon doing earlier and he has agreed to avoid Tylenol.  We'll 

adjust his doses of medications with a short-term of scheduled Roxicodone along 

with Lidoderm patch.  He also stated he was abruptly taken off of gabapentin 

when he was started on the Lyrica but due to his symptoms he started taking his 

home dose of gabapentin again.  While he is here we will perform a cross taper 

of gabapentin into Lyrica.  We'll contact surgeon for his preferences on timing 

of restarting DVT prophylaxis.





Patient participated in therapy and made good progress.  He was getting up 

without calling for assistance, therapy and I discussed the danger of this with 

him.  He called his surgeon and complained of excruciating pain.  Surgeon 

requested imaging. Imaging reviewed and forwarded to surgeon. Imaging did show 

some mild stenosis and facet arthropathy. Used a short taper of decadron to calm

 down the inflammation, seemed to work although patient still complained of 

pain. Placed patient on lovenox per request of surgeon.  Patient has behaviors 

c/w seeking. Complains of intense pain but is then able to function without any 

intervention or issue.  Adjusted pain medication on day one of therapy.  Started

 on cross taper of gabapentin and lyrica with an increase in lyrica.  Started 

lidoderm.  Patient involved in all of these decisions on first day of therapy. 

He asked to go to ER to get an IV for IV pain meds over the weekend.   He is 

aware of back brace requirements but is not always adherent.  Denies any other 

problem besides pain, which he describes as neuropathic in nature radiating from

 his back to front thigh and groin.  Patient is able to ambulate without any 

assitive device but is safer when he uses a RW.  We have talked to him several 

times about not walking without the RW and assistance but he continues.  Pain 

seems controlled.  Checked [place] PDMP database.  Will discharge with minimal 

opioids and klonopin.








Disposition: DC/TX-06 HOME UNDER HOME [facility]


Time spent for discharge: >30mins





Core Measure Documentation





- Palliative Care


Palliative Care/ Comfort Measures: Not Applicable





- Core Measures


Any of the following diagnoses?: none





Exam





- Physical Exam


Narrative exam: 


MUSCULOSKELETAL SPECIALTY EXAM





CONSTITUTIONAL:


Well developed, well nourished, appropriately groomed, obese, NAD





EENT:


EOMI.   Hearing intact to soft voice





RESPIRATORY:


Clear to auscultation bilaterally, no increased work of breathing 





CARDIOVASCULAR:  


Regular Rate/ Rhythm, no swelling, edema or tenderness in BUE or BLE. All 

extremities warm.  





GI: 


+ bowel sounds, soft, NTTP, nondistended.





INTEGUMENTARY:


Normal, no lesion, rash, masses or bruising noted in extremities.  Surgical site

 on her right flank intact with no signs of infection or drainage, staples 

removed.





MUSCULOSKELETAL:


BUE and BLE normal without defect, crepitus, subluxation, effusion, arthritic 

changes or TTP. 


BUE 4+/5, good ROM, with normal tone.  


LLE 4+/5 good ROM, with normal tone, RLE 4-/5 reduced ROM, with normal tone








NEURO:


CN 2-12 grossly intact. Sensation intact in all extremities.  No tremor noted in

 4 extremities.   





POSTURE and GAIT:


Sitting posture good. Balance appears reasonable.  Walking in the halls





PSYCH:


Alert, oriented x3, affect appears normal. Insight appears intact.  Decreased 

compliance with instructions.








- Constitutional


Vitals: 


                                        











Temp Pulse Resp BP Pulse Ox


 


 37.0 C   64   18   104/59   95 


 


 08/23/19 07:59  08/23/19 08:10  08/23/19 07:59  08/23/19 08:10  08/23/19 07:59














Plan


Activity: advance as tolerated, fall precautions


Diet: low cholesterol (cardiac)


Wound: keep clean and dry


Special Instructions: smoking cessation, no heavy lifting, physical therapy, 

occupational therapy, home health RN, other (Back brace on when OOB)


Durable Medical Equipment Needed Upon Discharge: Walker-Rolling


Additional Instructions: No bending, lifting or twisting.  Do not lift greater 

than 15lbs until cleared by surgeon at follow up.  Recheck CBC and BMP in 1 week

 at PCP.


Follow up with: 


PRIMARY CARE,MD [Primary Care Provider] - 7 Days


JEROMY CRUZ MD [Staff Physician] - 09/04/19 (Appointment is set)


Prescriptions: 


AtorvaSTATin [Lipitor] 10 mg PO QHS #30 tablet


Docusate Sodium [Colace CAP] 100 mg PO BID #60 capsule


Carvedilol [Coreg] 3.125 mg PO BID #60 tablet


Tamsulosin [Flomax] 0.4 mg PO HS #30 capsule


Folic Acid [Folvite] 1 mg PO QDAY #30 tablet


Nicotine [Habitrol] 21 mg TD QDAY #30 patch


Aspirin EC [Halfprin EC] 81 mg PO QDAY #30 tablet


clonazePAM [KlonoPIN] 0.5 mg PO BID PRN #30 tablet


 PRN Reason: Anxiety


Pregabalin [Lyrica] 100 mg PO TID 30 Days  capsule


Pantoprazole [Protonix TAB] 20 mg PO QDAY #30 tablet.


oxyCODONE [roxiCODONE] 10 mg PO Q8HR #30 tablet


Thiamine [Vitamin B-1] 100 mg PO QDAY #30 tablet


Cyanocobalamin [Vitamin B-12] 1,000 mcg PO QDAY #30 tablet


Cholecalciferol Vit D3 [Vitamin D3 1,000 UNIT TAB] 1,000 unit PO DAILY #30 

tablet

## 2020-01-25 ENCOUNTER — HOSPITAL ENCOUNTER (EMERGENCY)
Dept: HOSPITAL 5 - ED | Age: 70
Discharge: LEFT BEFORE DISCHARGE | End: 2020-01-25
Payer: MEDICARE

## 2020-01-25 VITALS — SYSTOLIC BLOOD PRESSURE: 144 MMHG | DIASTOLIC BLOOD PRESSURE: 78 MMHG

## 2020-01-25 DIAGNOSIS — Z53.21: ICD-10-CM

## 2020-01-25 DIAGNOSIS — F10.129: Primary | ICD-10-CM

## 2022-06-07 ENCOUNTER — HOSPITAL ENCOUNTER (EMERGENCY)
Dept: HOSPITAL 5 - ED | Age: 72
LOS: 1 days | Discharge: HOME | End: 2022-06-08
Payer: MEDICARE

## 2022-06-07 DIAGNOSIS — F10.239: Primary | ICD-10-CM

## 2022-06-07 DIAGNOSIS — Z88.6: ICD-10-CM

## 2022-06-07 DIAGNOSIS — Z88.8: ICD-10-CM

## 2022-06-07 LAB
ALBUMIN SERPL-MCNC: 4.3 G/DL (ref 3.9–5)
ALT SERPL-CCNC: 22 UNITS/L (ref 7–56)
BACTERIA #/AREA URNS HPF: (no result) /HPF
BASOPHILS # (AUTO): 0.2 K/MM3 (ref 0–0.1)
BASOPHILS NFR BLD AUTO: 2.9 % (ref 0–1.8)
BILIRUB UR QL STRIP: (no result)
BLOOD UR QL VISUAL: (no result)
BUN SERPL-MCNC: 12 MG/DL (ref 9–20)
BUN/CREAT SERPL: 20 %
CALCIUM SERPL-MCNC: 9.1 MG/DL (ref 8.4–10.2)
EOSINOPHIL # BLD AUTO: 0.4 K/MM3 (ref 0–0.4)
EOSINOPHIL NFR BLD AUTO: 5.7 % (ref 0–4.3)
HCT VFR BLD CALC: 40.2 % (ref 35.5–45.6)
HEMOLYSIS INDEX: 6
HGB BLD-MCNC: 13.3 GM/DL (ref 11.8–15.2)
LYMPHOCYTES # BLD AUTO: 0.9 K/MM3 (ref 1.2–5.4)
LYMPHOCYTES NFR BLD AUTO: 12.8 % (ref 13.4–35)
MCHC RBC AUTO-ENTMCNC: 33 % (ref 32–34)
MCV RBC AUTO: 91 FL (ref 84–94)
MONOCYTES # (AUTO): 0.7 K/MM3 (ref 0–0.8)
MONOCYTES % (AUTO): 9.7 % (ref 0–7.3)
PH UR STRIP: 5 [PH] (ref 5–7)
PLATELET # BLD: 308 K/MM3 (ref 140–440)
PROT UR STRIP-MCNC: (no result) MG/DL
RBC # BLD AUTO: 4.42 M/MM3 (ref 3.65–5.03)
RBC #/AREA URNS HPF: 1 /HPF (ref 0–6)
UROBILINOGEN UR-MCNC: < 2 MG/DL (ref ?–2)
WBC #/AREA URNS HPF: < 1 /HPF (ref 0–6)

## 2022-06-07 PROCEDURE — G0480 DRUG TEST DEF 1-7 CLASSES: HCPCS

## 2022-06-07 PROCEDURE — 81001 URINALYSIS AUTO W/SCOPE: CPT

## 2022-06-07 PROCEDURE — 96366 THER/PROPH/DIAG IV INF ADDON: CPT

## 2022-06-07 PROCEDURE — 80053 COMPREHEN METABOLIC PANEL: CPT

## 2022-06-07 PROCEDURE — 85025 COMPLETE CBC W/AUTO DIFF WBC: CPT

## 2022-06-07 PROCEDURE — 99284 EMERGENCY DEPT VISIT MOD MDM: CPT

## 2022-06-07 PROCEDURE — 80307 DRUG TEST PRSMV CHEM ANLYZR: CPT

## 2022-06-07 PROCEDURE — 96375 TX/PRO/DX INJ NEW DRUG ADDON: CPT

## 2022-06-07 PROCEDURE — 96365 THER/PROPH/DIAG IV INF INIT: CPT

## 2022-06-07 PROCEDURE — 80320 DRUG SCREEN QUANTALCOHOLS: CPT

## 2022-06-07 PROCEDURE — 36415 COLL VENOUS BLD VENIPUNCTURE: CPT

## 2022-06-07 NOTE — EMERGENCY DEPARTMENT REPORT
ED Medical Clearance HPI





- General


Chief complaint: Medical Clearance


Stated complaint: MEDICAL CLEARANCE


Time Seen by Provider: 06/07/22 21:19


Source: EMS


Mode of arrival: Stretcher





- History of Present Illness


Initial comments: 





71-year-old alcoholic male with history of CHF s/p pacemaker, liver cirrhosis 

and kidney injury who now presented with alcohol intoxication and withdrawal.  

Patient reported generalized body shakiness.  She reported history of alcoholic 

seizure in the past.  Patient reported the last drink was around 2 PM this 

afternoon.  No other modifying or associated factors reported.


Home medications: 


                                  Previous Rx's











 Medication  Instructions  Recorded  Last Taken  Type


 


Aspirin EC [Halfprin EC] 81 mg PO QDAY #30 tablet 08/23/19 Unknown Rx


 


AtorvaSTATin 10 mg PO QHS #30 tablet 08/23/19 Unknown Rx


 


Cholecalciferol Vit D3 [Vitamin D3 1,000 unit PO DAILY #30 tablet 08/23/19 

Unknown Rx





1,000 UNIT TAB]    


 


Cyanocobalamin [Vitamin B-12] 1,000 mcg PO QDAY #30 tablet 08/23/19 Unknown Rx


 


Docusate Sodium [Colace CAP] 100 mg PO BID #60 capsule 08/23/19 Unknown Rx


 


Folic Acid [Folvite] 1 mg PO QDAY #30 tablet 08/23/19 Unknown Rx


 


Lidocaine Topical 5% [Xylocaine 1 applic TP BID  tube 08/23/19 Unknown Rx





Topical 5%]    


 


Nicotine [Habitrol] 21 mg TD QDAY #30 patch 08/23/19 Unknown Rx


 


Pregabalin 100 mg PO TID 30 Days  capsule 08/23/19 Unknown Rx


 


Tamsulosin [Flomax] 0.4 mg PO HS #30 capsule 08/23/19 Unknown Rx


 


Thiamine [Vitamin B-1] 100 mg PO QDAY #30 tablet 08/23/19 Unknown Rx


 


Venlafaxine [Effexor 37.5mg tab] 37.5 mg PO BID  tablet 08/23/19 Unknown Rx


 


carvediloL [Coreg] 3.125 mg PO BID #60 tablet 08/23/19 Unknown Rx


 


clonazePAM [KlonoPIN] 0.5 mg PO BID PRN #30 tablet 08/23/19 Unknown Rx


 


oxyCODONE [roxiCODONE] 10 mg PO Q8HR #30 tablet 08/23/19 Unknown Rx


 


Pantoprazole [Protonix TAB] 20 mg PO QDAY #30 tablet.dr 06/08/22 Unknown Rx


 


chlordiazePOXIDE [Librium] 25 mg PO Q6H 5 Days #20 cap NS 06/08/22 Unknown Rx











Allergies/Adverse reactions: 


                                    Allergies











Allergy/AdvReac Type Severity Reaction Status Date / Time


 


haloperidol [From Haldol] Allergy  Unknown Verified 06/08/22 02:24


 


haloperidol lactate Allergy  Unknown Verified 06/08/22 02:24





[From Haldol]     


 


ketorolac [From Toradol] Allergy  Unknown Verified 06/08/22 02:24


 


meperidine HCl [From Demerol] Allergy  Unknown Verified 06/08/22 02:24


 


nitroglycerin Allergy  Unknown Verified 06/08/22 02:24


 


tramadol Allergy  Unknown Verified 06/08/22 02:24


 


acetaminophen [From Tylenol] AdvReac  Unknown Verified 06/08/22 02:24














ED Review of Systems


ROS: 


Stated complaint: MEDICAL CLEARANCE


Other details as noted in HPI





Comment: All other systems reviewed and negative


Cardiovascular: chest pain


Gastrointestinal: nausea.  denies: vomiting


Neurological: other (generalized shakiness )





ED Past Medical Hx





- Past Medical History


Hx Hypertension: Yes


Hx Congestive Heart Failure: Yes


Hx Deep Vein Thrombosis: No


Hx Liver Disease: Yes (hep C)


Hx Headaches / Migraines: Yes


Hx Seizures: Yes (2013)


Hx Psychiatric Treatment: Yes (posttraumatic stress disorder, bipolar)


Hx COPD: Yes


Hx Dementia: Yes (early per patient)


Hx HIV: No





- Surgical History


Hx Pacemaker: No


Hx Internal Defibrillator: No





- Social History


Smoking Status: Current Every Day Smoker


Substance Use Type: Alcohol, Other





- Medications


Home Medications: 


                                Home Medications











 Medication  Instructions  Recorded  Confirmed  Last Taken  Type


 


Aspirin EC [Halfprin EC] 81 mg PO QDAY #30 tablet 08/23/19  Unknown Rx


 


AtorvaSTATin 10 mg PO QHS #30 tablet 08/23/19  Unknown Rx


 


Cholecalciferol Vit D3 [Vitamin D3 1,000 unit PO DAILY #30 tablet 08/23/19  

Unknown Rx





1,000 UNIT TAB]     


 


Cyanocobalamin [Vitamin B-12] 1,000 mcg PO QDAY #30 tablet 08/23/19  Unknown Rx


 


Docusate Sodium [Colace CAP] 100 mg PO BID #60 capsule 08/23/19  Unknown Rx


 


Folic Acid [Folvite] 1 mg PO QDAY #30 tablet 08/23/19  Unknown Rx


 


Lidocaine Topical 5% [Xylocaine 1 applic TP BID  tube 08/23/19  Unknown Rx





Topical 5%]     


 


Nicotine [Habitrol] 21 mg TD QDAY #30 patch 08/23/19  Unknown Rx


 


Pregabalin 100 mg PO TID 30 Days  capsule 08/23/19  Unknown Rx


 


Tamsulosin [Flomax] 0.4 mg PO HS #30 capsule 08/23/19  Unknown Rx


 


Thiamine [Vitamin B-1] 100 mg PO QDAY #30 tablet 08/23/19  Unknown Rx


 


Venlafaxine [Effexor 37.5mg tab] 37.5 mg PO BID  tablet 08/23/19  Unknown Rx


 


carvediloL [Coreg] 3.125 mg PO BID #60 tablet 08/23/19  Unknown Rx


 


clonazePAM [KlonoPIN] 0.5 mg PO BID PRN #30 tablet 08/23/19  Unknown Rx


 


oxyCODONE [roxiCODONE] 10 mg PO Q8HR #30 tablet 08/23/19  Unknown Rx


 


Pantoprazole [Protonix TAB] 20 mg PO QDAY #30 tablet. 06/08/22  Unknown Rx


 


chlordiazePOXIDE [Librium] 25 mg PO Q6H 5 Days #20 cap NS 06/08/22  Unknown Rx














ED Physical Exam





- General


Limitations: No Limitations


General appearance: alert, in no apparent distress





- Head


Head exam: Present: normal inspection





- Eye


Eye exam: Present: normal appearance


Pupils: Present: normal accommodation





- ENT


ENT exam: Present: normal exam, normal orophraynx, mucous membranes dry





- Neck


Neck exam: Present: normal inspection, full ROM





- Respiratory


Respiratory exam: Present: normal lung sounds bilaterally.  Absent: respiratory 

distress, accessory muscle use





- Cardiovascular


Cardiovascular Exam: Present: regular rate, normal rhythm, normal heart sounds





- GI/Abdominal


GI/Abdominal exam: Present: soft.  Absent: tenderness





ED Course


                                   Vital Signs











  06/07/22 06/07/22 06/07/22





  19:52 19:57 20:01


 


Temperature  98 F 


 


Pulse Rate 64 63 63


 


Respiratory 11 L 16 9 L





Rate   


 


Blood Pressure   123/54


 


Blood Pressure  123/54 





[Right]   


 


O2 Sat by Pulse 92 96 89





Oximetry   














  06/07/22 06/07/22 06/07/22





  20:31 20:55 21:01


 


Temperature   


 


Pulse Rate 77 65 63


 


Respiratory 16 17 11 L





Rate   


 


Blood Pressure 125/44 139/40 139/40


 


Blood Pressure   





[Right]   


 


O2 Sat by Pulse 92 91 96





Oximetry   














  06/07/22 06/07/22 06/07/22





  21:07 21:31 22:01


 


Temperature   


 


Pulse Rate  68 67


 


Respiratory  12 16





Rate   


 


Blood Pressure  126/60 132/57


 


Blood Pressure   





[Right]   


 


O2 Sat by Pulse 96 93 94





Oximetry   














  06/07/22 06/07/22 06/07/22





  22:31 23:01 23:31


 


Temperature   


 


Pulse Rate 68 73 67


 


Respiratory 16 13 13





Rate   


 


Blood Pressure 132/62 132/62 135/66


 


Blood Pressure   





[Right]   


 


O2 Sat by Pulse 93 94 93





Oximetry   














  06/08/22 06/08/22 06/08/22





  00:00 00:31 01:01


 


Temperature   


 


Pulse Rate 67 69 66


 


Respiratory 16 14 12





Rate   


 


Blood Pressure 135/66 124/49 122/42


 


Blood Pressure   





[Right]   


 


O2 Sat by Pulse 86 86 90





Oximetry   














  06/08/22 06/08/22





  01:31 02:01


 


Temperature  


 


Pulse Rate 65 67


 


Respiratory 14 17





Rate  


 


Blood Pressure 122/51 120/49


 


Blood Pressure  





[Right]  


 


O2 Sat by Pulse 89 89





Oximetry  














- Reevaluation(s)


Reevaluation #1: 





06/07/22 22:01


here with concern alcohol withdrawal with last ingestion around 2PM--and wanted 

help-- will go ahead and order routine labs including CBC, CMP and start ivf ns 

1L bolus x 1-- 


Reevaluation #2: 





06/08/22 04:15


Patient reevaluated and reports feeling much better after the above treatment.  

Lab reviewed to be pretty unremarkable including LFTs except alk phos now is 

slightly elevated.  Patient reassured and discharged home on Librium to help 

with alcohol withdrawal.





ED Medical Decision Making





- Lab Data


Result diagrams: 


                                 06/07/22 22:54





                                 06/07/22 22:54





ED Disposition


Clinical Impression: 


Alcohol withdrawal


Qualifiers:


 Complication of substance-induced condition: with unspecified complication 

Qualified Code(s): F10.239 - Alcohol dependence with withdrawal, unspecified





Alcohol intoxication


Qualifiers:


 Complication of substance-induced condition: with unspecified complication 

Qualified Code(s): F10.929 - Alcohol use, unspecified with intoxication, 

unspecified





Disposition: 01 HOME / SELF CARE / HOMELESS


Is pt being admited?: No


Does the pt Need Aspirin: No


Condition: Stable


Instructions:  Alcohol Withdrawal Syndrome, Easy-to-Read, Alcohol Withdrawal 

Syndrome, Finding Treatment for Addiction, Binge-Drinking Information, Adult


Additional Instructions: 


Cut back or avoid alcohol abuse to help your overall health





Increase your daily fluid to help your hydration





Call and schedule follow-up with your primary doctor in the next 3 to 5 days for

 progress





Please do not hesitate to call or return to emergency if your symptoms worsen





Take your new medication as prescribed to help your symptoms and prevent alcohol

 withdrawal symptoms


Prescriptions: 


chlordiazePOXIDE [Librium] 25 mg PO Q6H 5 Days #20 cap NS


Pantoprazole [Protonix TAB] 20 mg PO QDAY #30 tablet.


Time of Disposition: 04:17

## 2022-06-08 VITALS — DIASTOLIC BLOOD PRESSURE: 46 MMHG | SYSTOLIC BLOOD PRESSURE: 114 MMHG

## 2023-11-16 ENCOUNTER — OFFICE VISIT (OUTPATIENT)
Dept: URBAN - METROPOLITAN AREA CLINIC 88 | Facility: CLINIC | Age: 73
End: 2023-11-16

## 2023-12-06 ENCOUNTER — OFFICE VISIT (OUTPATIENT)
Dept: URBAN - METROPOLITAN AREA CLINIC 88 | Facility: CLINIC | Age: 73
End: 2023-12-06

## 2023-12-06 NOTE — HPI-TODAY'S VISIT:
Referred by RUSLAN Vaz for evalaution of GERD.  A copy of this note will be sent to the referring provider.

## 2024-01-03 ENCOUNTER — OFFICE VISIT (OUTPATIENT)
Dept: URBAN - METROPOLITAN AREA CLINIC 88 | Facility: CLINIC | Age: 74
End: 2024-01-03

## 2024-01-18 ENCOUNTER — LAB OUTSIDE AN ENCOUNTER (OUTPATIENT)
Dept: URBAN - METROPOLITAN AREA CLINIC 88 | Facility: CLINIC | Age: 74
End: 2024-01-18

## 2024-01-18 ENCOUNTER — OFFICE VISIT (OUTPATIENT)
Dept: URBAN - METROPOLITAN AREA CLINIC 88 | Facility: CLINIC | Age: 74
End: 2024-01-18
Payer: OTHER GOVERNMENT

## 2024-01-18 VITALS
BODY MASS INDEX: 31.1 KG/M2 | OXYGEN SATURATION: 96 % | SYSTOLIC BLOOD PRESSURE: 132 MMHG | HEART RATE: 87 BPM | HEIGHT: 68 IN | WEIGHT: 205.2 LBS | DIASTOLIC BLOOD PRESSURE: 84 MMHG | TEMPERATURE: 96.8 F

## 2024-01-18 DIAGNOSIS — R10.10 UPPER ABDOMINAL PAIN: ICD-10-CM

## 2024-01-18 DIAGNOSIS — Z86.010 PERSONAL HISTORY OF COLONIC POLYPS: ICD-10-CM

## 2024-01-18 DIAGNOSIS — R11.0 NAUSEA: ICD-10-CM

## 2024-01-18 PROBLEM — 428283002: Status: ACTIVE | Noted: 2024-01-18

## 2024-01-18 PROCEDURE — 99204 OFFICE O/P NEW MOD 45 MIN: CPT | Performed by: NURSE PRACTITIONER

## 2024-01-18 RX ORDER — CLONAZEPAM 0.5 MG/1
TAKE 1/2 TABLET BY MOUTH THREE TIMES DAILY TABLET ORAL
Qty: 90 EACH | Refills: 0 | Status: ACTIVE | COMMUNITY

## 2024-01-18 RX ORDER — RISPERIDONE 1 MG/1
TABLET ORAL
Qty: 28 EACH | Refills: 0 | Status: ACTIVE | COMMUNITY

## 2024-01-18 RX ORDER — VENLAFAXINE HYDROCHLORIDE 75 MG/1
TAKE 3 CAPSULE BY MOUTH DAILY FOR 7 DAYS CAPSULE, EXTENDED RELEASE ORAL
Qty: 21 EACH | Refills: 0 | Status: ACTIVE | COMMUNITY

## 2024-01-18 RX ORDER — CARVEDILOL 3.12 MG/1
TAKE 1 TABLET BY MOUTH TWICE A DAY TABLET, FILM COATED ORAL
Qty: 180 EACH | Refills: 0 | Status: ACTIVE | COMMUNITY

## 2024-01-18 NOTE — PHYSICAL EXAM GASTROINTESTINAL
Abdomen , soft, nontender, nondistended , small mid-epigastric ventral hernia, no guarding or rigidity , no masses palpable , normal bowel sounds , Liver and Spleen: no hepatosplenomegaly

## 2024-01-18 NOTE — HPI-TODAY'S VISIT:
Referred by Dr. Karlie Brian for evaluation of suspected reflux and schedule screening colonoscopy.  A copy of this note will be sent to the referring provider.  Last colonoscopy was by Dr. Desai at [facility] with polyp removed.  Denies signs of rectal bleeding, constipation or diarrhea.    Voices burning right sided and epigastric abdominal pain that has been occuring intermittently x 2 month.  Unable to identify specific triggers for this pain.  Occasional nausea w/o vomiting occurs with his pain.  Taking ondansteron as needed to manage nausea.  Denies symptoms of reflux, melena, hx of PUD or recent use of PPI medication.  Voices an EGD procedure by Dr. Desai in 2018 as well.  Unable to recall findings.

## 2024-01-22 ENCOUNTER — TELEPHONE ENCOUNTER (OUTPATIENT)
Dept: URBAN - METROPOLITAN AREA CLINIC 118 | Facility: CLINIC | Age: 74
End: 2024-01-22

## 2024-01-23 ENCOUNTER — TELEPHONE ENCOUNTER (OUTPATIENT)
Dept: URBAN - METROPOLITAN AREA CLINIC 95 | Facility: CLINIC | Age: 74
End: 2024-01-23

## 2024-01-23 ENCOUNTER — OFFICE VISIT (OUTPATIENT)
Dept: URBAN - METROPOLITAN AREA SURGERY CENTER 24 | Facility: SURGERY CENTER | Age: 74
End: 2024-01-23

## 2024-01-23 ENCOUNTER — TELEPHONE ENCOUNTER (OUTPATIENT)
Dept: URBAN - METROPOLITAN AREA CLINIC 88 | Facility: CLINIC | Age: 74
End: 2024-01-23

## 2024-01-30 ENCOUNTER — OFFICE VISIT (OUTPATIENT)
Dept: URBAN - METROPOLITAN AREA SURGERY CENTER 23 | Facility: SURGERY CENTER | Age: 74
End: 2024-01-30

## 2024-01-30 ENCOUNTER — OUT OF OFFICE VISIT (OUTPATIENT)
Dept: URBAN - METROPOLITAN AREA SURGERY CENTER 23 | Facility: SURGERY CENTER | Age: 74
End: 2024-01-30
Payer: OTHER GOVERNMENT

## 2024-01-30 ENCOUNTER — TELEPHONE ENCOUNTER (OUTPATIENT)
Dept: URBAN - METROPOLITAN AREA CLINIC 94 | Facility: CLINIC | Age: 74
End: 2024-01-30

## 2024-01-30 ENCOUNTER — CLAIMS CREATED FROM THE CLAIM WINDOW (OUTPATIENT)
Dept: URBAN - METROPOLITAN AREA CLINIC 4 | Facility: CLINIC | Age: 74
End: 2024-01-30
Payer: OTHER GOVERNMENT

## 2024-01-30 DIAGNOSIS — Z86.010 ADENOMAS PERSONAL HISTORY OF COLONIC POLYPS: ICD-10-CM

## 2024-01-30 DIAGNOSIS — Z09 ENCOUNTER FOR COLONOSCOPY FOLLOWING COLON POLYP REMOVAL: ICD-10-CM

## 2024-01-30 DIAGNOSIS — K22.89 MUCOSAL ABNORMALITY OF ESOPHAGUS: ICD-10-CM

## 2024-01-30 DIAGNOSIS — K29.70 GASTRITIS: ICD-10-CM

## 2024-01-30 DIAGNOSIS — K29.70 GASTRITIS, UNSPECIFIED, WITHOUT BLEEDING: ICD-10-CM

## 2024-01-30 DIAGNOSIS — Z86.010 PERSONAL HISTORY OF COLONIC POLYP: ICD-10-CM

## 2024-01-30 PROCEDURE — 88342 IMHCHEM/IMCYTCHM 1ST ANTB: CPT | Performed by: PATHOLOGY

## 2024-01-30 PROCEDURE — 00813 ANES UPR LWR GI NDSC PX: CPT | Performed by: NURSE ANESTHETIST, CERTIFIED REGISTERED

## 2024-01-30 PROCEDURE — 88305 TISSUE EXAM BY PATHOLOGIST: CPT | Performed by: PATHOLOGY

## 2024-01-30 RX ORDER — RISPERIDONE 1 MG/1
TABLET ORAL
Qty: 28 EACH | Refills: 0 | Status: ACTIVE | COMMUNITY

## 2024-01-30 RX ORDER — CARVEDILOL 3.12 MG/1
TAKE 1 TABLET BY MOUTH TWICE A DAY TABLET, FILM COATED ORAL
Qty: 180 EACH | Refills: 0 | Status: ACTIVE | COMMUNITY

## 2024-01-30 RX ORDER — VENLAFAXINE HYDROCHLORIDE 75 MG/1
TAKE 3 CAPSULE BY MOUTH DAILY FOR 7 DAYS CAPSULE, EXTENDED RELEASE ORAL
Qty: 21 EACH | Refills: 0 | Status: ACTIVE | COMMUNITY

## 2024-01-30 RX ORDER — CLONAZEPAM 0.5 MG/1
TAKE 1/2 TABLET BY MOUTH THREE TIMES DAILY TABLET ORAL
Qty: 90 EACH | Refills: 0 | Status: ACTIVE | COMMUNITY

## 2024-01-30 RX ORDER — PANTOPRAZOLE SODIUM 40 MG/1
1 TABLET TABLET, DELAYED RELEASE ORAL ONCE A DAY
Qty: 90 TABLET | Refills: 1 | OUTPATIENT
Start: 2024-01-30

## 2024-03-18 ENCOUNTER — OV EP (OUTPATIENT)
Dept: URBAN - METROPOLITAN AREA CLINIC 118 | Facility: CLINIC | Age: 74
End: 2024-03-18
Payer: OTHER GOVERNMENT

## 2024-03-18 VITALS
DIASTOLIC BLOOD PRESSURE: 71 MMHG | HEART RATE: 72 BPM | WEIGHT: 211.2 LBS | HEIGHT: 68 IN | BODY MASS INDEX: 32.01 KG/M2 | SYSTOLIC BLOOD PRESSURE: 139 MMHG | TEMPERATURE: 97.9 F

## 2024-03-18 DIAGNOSIS — Z86.010 PERSONAL HISTORY OF COLONIC POLYPS: ICD-10-CM

## 2024-03-18 DIAGNOSIS — R10.13 DYSPEPSIA: ICD-10-CM

## 2024-03-18 PROCEDURE — 99213 OFFICE O/P EST LOW 20 MIN: CPT | Performed by: INTERNAL MEDICINE

## 2024-03-18 RX ORDER — RISPERIDONE 1 MG/1
TABLET ORAL
Qty: 28 EACH | Refills: 0 | COMMUNITY

## 2024-03-18 RX ORDER — PANTOPRAZOLE SODIUM 40 MG/1
1 TABLET TABLET, DELAYED RELEASE ORAL ONCE A DAY
Qty: 90 TABLET | Refills: 1 | COMMUNITY
Start: 2024-01-30

## 2024-03-18 RX ORDER — VENLAFAXINE HYDROCHLORIDE 75 MG/1
TAKE 3 CAPSULE BY MOUTH DAILY FOR 7 DAYS CAPSULE, EXTENDED RELEASE ORAL
Qty: 21 EACH | Refills: 0 | COMMUNITY

## 2024-03-18 RX ORDER — CLONAZEPAM 0.5 MG/1
TAKE 1/2 TABLET BY MOUTH THREE TIMES DAILY TABLET ORAL
Qty: 90 EACH | Refills: 0 | COMMUNITY

## 2024-03-18 RX ORDER — CARVEDILOL 3.12 MG/1
TAKE 1 TABLET BY MOUTH TWICE A DAY TABLET, FILM COATED ORAL
Qty: 180 EACH | Refills: 0 | COMMUNITY

## 2024-03-18 NOTE — HPI-TODAY'S VISIT:
The patient presents for f/u appt.  had recent EGD with unremarkable bx's.  denies upper gi complaints/symptoms at present time.  had colonoscopy with poor prep but no significant lesions seen with 1 year recommended repeat.  + weight gain past few years.  denies abd pain, gi bleeding, n/v, etc.
